# Patient Record
Sex: MALE | Race: WHITE | Employment: FULL TIME | URBAN - METROPOLITAN AREA
[De-identification: names, ages, dates, MRNs, and addresses within clinical notes are randomized per-mention and may not be internally consistent; named-entity substitution may affect disease eponyms.]

---

## 2021-05-31 ENCOUNTER — RECORDS - HEALTHEAST (OUTPATIENT)
Dept: ADMINISTRATIVE | Facility: CLINIC | Age: 61
End: 2021-05-31

## 2023-06-07 ENCOUNTER — TRANSFERRED RECORDS (OUTPATIENT)
Dept: HEALTH INFORMATION MANAGEMENT | Facility: CLINIC | Age: 63
End: 2023-06-07

## 2023-06-08 ENCOUNTER — TRANSFERRED RECORDS (OUTPATIENT)
Dept: HEALTH INFORMATION MANAGEMENT | Facility: CLINIC | Age: 63
End: 2023-06-08

## 2023-07-07 ASSESSMENT — ENCOUNTER SYMPTOMS
HEADACHES: 0
SHORTNESS OF BREATH: 0
PALPITATIONS: 0
NERVOUS/ANXIOUS: 0
DIARRHEA: 0
HEARTBURN: 0
FREQUENCY: 0
ABDOMINAL PAIN: 0
WEAKNESS: 0
MYALGIAS: 0
PARESTHESIAS: 0
COUGH: 0
NAUSEA: 0
ARTHRALGIAS: 0
SORE THROAT: 0
JOINT SWELLING: 0
DYSURIA: 0
EYE PAIN: 0
FEVER: 0
CONSTIPATION: 0
HEMATURIA: 0
CHILLS: 0
HEMATOCHEZIA: 0
DIZZINESS: 0

## 2023-07-14 ENCOUNTER — OFFICE VISIT (OUTPATIENT)
Dept: FAMILY MEDICINE | Facility: CLINIC | Age: 63
End: 2023-07-14
Payer: COMMERCIAL

## 2023-07-14 VITALS
HEART RATE: 64 BPM | HEIGHT: 69 IN | TEMPERATURE: 96.4 F | SYSTOLIC BLOOD PRESSURE: 136 MMHG | RESPIRATION RATE: 16 BRPM | WEIGHT: 199 LBS | BODY MASS INDEX: 29.47 KG/M2 | DIASTOLIC BLOOD PRESSURE: 89 MMHG | OXYGEN SATURATION: 96 %

## 2023-07-14 DIAGNOSIS — Z12.11 SCREEN FOR COLON CANCER: ICD-10-CM

## 2023-07-14 DIAGNOSIS — Z12.5 SCREENING FOR PROSTATE CANCER: ICD-10-CM

## 2023-07-14 DIAGNOSIS — Z11.59 NEED FOR HEPATITIS C SCREENING TEST: ICD-10-CM

## 2023-07-14 DIAGNOSIS — Z11.4 SCREENING FOR HIV (HUMAN IMMUNODEFICIENCY VIRUS): ICD-10-CM

## 2023-07-14 DIAGNOSIS — Z00.00 ROUTINE GENERAL MEDICAL EXAMINATION AT A HEALTH CARE FACILITY: Primary | ICD-10-CM

## 2023-07-14 DIAGNOSIS — R20.0 NUMBNESS OF RIGHT FOOT: ICD-10-CM

## 2023-07-14 DIAGNOSIS — Z13.220 LIPID SCREENING: ICD-10-CM

## 2023-07-14 LAB
ALBUMIN SERPL BCG-MCNC: 4.7 G/DL (ref 3.5–5.2)
ALP SERPL-CCNC: 59 U/L (ref 40–129)
ALT SERPL W P-5'-P-CCNC: 28 U/L (ref 0–70)
ANION GAP SERPL CALCULATED.3IONS-SCNC: 11 MMOL/L (ref 7–15)
AST SERPL W P-5'-P-CCNC: 29 U/L (ref 0–45)
BASOPHILS # BLD AUTO: 0 10E3/UL (ref 0–0.2)
BASOPHILS NFR BLD AUTO: 1 %
BILIRUB SERPL-MCNC: 0.6 MG/DL
BUN SERPL-MCNC: 15.1 MG/DL (ref 8–23)
CALCIUM SERPL-MCNC: 9.6 MG/DL (ref 8.8–10.2)
CHLORIDE SERPL-SCNC: 105 MMOL/L (ref 98–107)
CHOLEST SERPL-MCNC: 204 MG/DL
CREAT SERPL-MCNC: 1.03 MG/DL (ref 0.67–1.17)
DEPRECATED HCO3 PLAS-SCNC: 25 MMOL/L (ref 22–29)
EOSINOPHIL # BLD AUTO: 0.2 10E3/UL (ref 0–0.7)
EOSINOPHIL NFR BLD AUTO: 3 %
ERYTHROCYTE [DISTWIDTH] IN BLOOD BY AUTOMATED COUNT: 12.1 % (ref 10–15)
GFR SERPL CREATININE-BSD FRML MDRD: 82 ML/MIN/1.73M2
GLUCOSE SERPL-MCNC: 91 MG/DL (ref 70–99)
HCT VFR BLD AUTO: 46.3 % (ref 40–53)
HDLC SERPL-MCNC: 62 MG/DL
HGB BLD-MCNC: 15.9 G/DL (ref 13.3–17.7)
IMM GRANULOCYTES # BLD: 0 10E3/UL
IMM GRANULOCYTES NFR BLD: 0 %
LDLC SERPL CALC-MCNC: 120 MG/DL
LYMPHOCYTES # BLD AUTO: 1.5 10E3/UL (ref 0.8–5.3)
LYMPHOCYTES NFR BLD AUTO: 29 %
MCH RBC QN AUTO: 31.5 PG (ref 26.5–33)
MCHC RBC AUTO-ENTMCNC: 34.3 G/DL (ref 31.5–36.5)
MCV RBC AUTO: 92 FL (ref 78–100)
MONOCYTES # BLD AUTO: 0.5 10E3/UL (ref 0–1.3)
MONOCYTES NFR BLD AUTO: 10 %
NEUTROPHILS # BLD AUTO: 2.9 10E3/UL (ref 1.6–8.3)
NEUTROPHILS NFR BLD AUTO: 56 %
NONHDLC SERPL-MCNC: 142 MG/DL
PLATELET # BLD AUTO: 212 10E3/UL (ref 150–450)
POTASSIUM SERPL-SCNC: 4.3 MMOL/L (ref 3.4–5.3)
PROT SERPL-MCNC: 7.3 G/DL (ref 6.4–8.3)
PSA SERPL DL<=0.01 NG/ML-MCNC: 2.04 NG/ML (ref 0–4.5)
RBC # BLD AUTO: 5.04 10E6/UL (ref 4.4–5.9)
SODIUM SERPL-SCNC: 141 MMOL/L (ref 136–145)
TRIGL SERPL-MCNC: 110 MG/DL
WBC # BLD AUTO: 5.1 10E3/UL (ref 4–11)

## 2023-07-14 PROCEDURE — 80061 LIPID PANEL: CPT | Performed by: FAMILY MEDICINE

## 2023-07-14 PROCEDURE — 87389 HIV-1 AG W/HIV-1&-2 AB AG IA: CPT | Performed by: FAMILY MEDICINE

## 2023-07-14 PROCEDURE — 90715 TDAP VACCINE 7 YRS/> IM: CPT | Performed by: FAMILY MEDICINE

## 2023-07-14 PROCEDURE — 90471 IMMUNIZATION ADMIN: CPT | Performed by: FAMILY MEDICINE

## 2023-07-14 PROCEDURE — 86803 HEPATITIS C AB TEST: CPT | Performed by: FAMILY MEDICINE

## 2023-07-14 PROCEDURE — 36415 COLL VENOUS BLD VENIPUNCTURE: CPT | Performed by: FAMILY MEDICINE

## 2023-07-14 PROCEDURE — 80053 COMPREHEN METABOLIC PANEL: CPT | Performed by: FAMILY MEDICINE

## 2023-07-14 PROCEDURE — 99386 PREV VISIT NEW AGE 40-64: CPT | Mod: 25 | Performed by: FAMILY MEDICINE

## 2023-07-14 PROCEDURE — 85025 COMPLETE CBC W/AUTO DIFF WBC: CPT | Performed by: FAMILY MEDICINE

## 2023-07-14 PROCEDURE — G0103 PSA SCREENING: HCPCS | Performed by: FAMILY MEDICINE

## 2023-07-14 ASSESSMENT — ENCOUNTER SYMPTOMS
FEVER: 0
ARTHRALGIAS: 0
MYALGIAS: 0
HEARTBURN: 0
WEAKNESS: 0
DYSURIA: 0
HEMATURIA: 0
JOINT SWELLING: 0
HEADACHES: 0
DIZZINESS: 0
ABDOMINAL PAIN: 0
SORE THROAT: 0
COUGH: 0
PARESTHESIAS: 0
PALPITATIONS: 0
FREQUENCY: 0
EYE PAIN: 0
CONSTIPATION: 0
NAUSEA: 0
CHILLS: 0
NERVOUS/ANXIOUS: 0
SHORTNESS OF BREATH: 0
HEMATOCHEZIA: 0
DIARRHEA: 0

## 2023-07-14 NOTE — PROGRESS NOTES
ASSESSMENT/PLAN:       ICD-10-CM    1. Routine general medical examination at a health care facility  Z00.00 CBC with platelets and differential     Comprehensive metabolic panel (BMP + Alb, Alk Phos, ALT, AST, Total. Bili, TP)     CBC with platelets and differential     Comprehensive metabolic panel (BMP + Alb, Alk Phos, ALT, AST, Total. Bili, TP)      2. Screen for colon cancer  Z12.11 Colonoscopy Screening  Referral      3. Screening for HIV (human immunodeficiency virus)  Z11.4 HIV Antigen Antibody Combo     HIV Antigen Antibody Combo      4. Need for hepatitis C screening test  Z11.59 Hepatitis C Screen Reflex to HCV RNA Quant and Genotype     Hepatitis C Screen Reflex to HCV RNA Quant and Genotype      5. Screening for prostate cancer  Z12.5 PSA, screen     PSA, screen      6. Lipid screening  Z13.220 Lipid panel reflex to direct LDL Non-fasting     Lipid panel reflex to direct LDL Non-fasting      7. Numbness of right foot  R20.0         Medical decision making: New patient to clinic.  Has not seen a physician for a long time.  Feels healthy except for some noted numbness in his right lateral side of the foot which he believes can sided with a back sprain about a year ago.  However the sprain pain is on the left side.  These do not correlate.  Exam shows some numbness in the plantar side of the left foot but also some callosities.  Further work-up can be considered.  Patient to schedule an appointment if he would like further work-up for his numbness that may include additional blood testing along with possible EMG.  Screening and health maintenance labs discussed and ordered as above.    Patient had a colonoscopy last year and was noted to have polyps.  Was asked to follow-up in couple of years.  I have ordered colonoscopy as his sister  from colon cancer.  We will also obtain records and review regarding follow-up plan.    COUNSELING:   Reviewed preventive health counseling, as reflected in  "patient instructions       Regular exercise       Healthy diet/nutrition       Vision screening       Consider Hep C screening for all patients one time for ages 18-79 years       HIV screeninx in teen years, 1x in adult years, and at intervals if high risk       Colorectal cancer screening       Prostate cancer screening      BMI:   Estimated body mass index is 29.18 kg/m  as calculated from the following:    Height as of this encounter: 1.759 m (5' 9.25\").    Weight as of this encounter: 90.3 kg (199 lb).   Weight management plan: Discussed healthy diet and exercise guidelines      He reports that he has never smoked. He has never been exposed to tobacco smoke. He has never used smokeless tobacco.      SUBJECTIVE:   CC: Jeramie is an 62 year old who presents for preventative health visit.       2023     3:05 PM   Additional Questions   Roomed by Braulio HILL   Accompanied by prerna     Healthy Habits:     Getting at least 3 servings of Calcium per day:  NO    Bi-annual eye exam:  Yes    Dental care twice a year:  Yes    Sleep apnea or symptoms of sleep apnea:  None    Diet:  Regular (no restrictions)    Frequency of exercise:  6-7 days/week    Duration of exercise:  Greater than 60 minutes    Taking medications regularly:  Yes    Medication side effects:  None    Additional concerns today:  No      Today's PHQ-2 Score:       2023     2:50 PM   PHQ-2 ( 1999 [employer])   Q1: Little interest or pleasure in doing things 0   Q2: Feeling down, depressed or hopeless 0   PHQ-2 Score 0   Q1: Little interest or pleasure in doing things Not at all   Q2: Feeling down, depressed or hopeless Not at all   PHQ-2 Score 0             Have you ever done Advance Care Planning?  No, advance care planning information given to patient to review.  Advanced care planning was discussed at today's visit.    Social History     Tobacco Use     Smoking status: Never     Passive exposure: Never     Smokeless tobacco: Never   Substance " Use Topics     Alcohol use: Not on file             7/7/2023     2:24 PM   Alcohol Use   Prescreen: >3 drinks/day or >7 drinks/week? No       Last PSA: No results found for: PSA    Reviewed orders with patient. Reviewed health maintenance and updated orders accordingly - Yes  BP Readings from Last 3 Encounters:   07/14/23 136/89    Wt Readings from Last 3 Encounters:   07/14/23 90.3 kg (199 lb)                  There is no problem list on file for this patient.    History reviewed. No pertinent surgical history.    Social History     Tobacco Use     Smoking status: Never     Passive exposure: Never     Smokeless tobacco: Never   Substance Use Topics     Alcohol use: Not on file     Family History   Problem Relation Age of Onset     Hyperlipidemia Mother      Hypertension Mother      Hyperlipidemia Father      Prostate Cancer Father      Colon Cancer Sister      Prostate Cancer Brother          Current Outpatient Medications   Medication Sig Dispense Refill     Multiple Vitamins-Minerals (CENTRUM SILVER 50+MEN PO) Take by mouth daily         Reviewed and updated as needed this visit by clinical staff   Tobacco  Allergies  Meds  Problems  Med Hx  Surg Hx  Fam Hx          Reviewed and updated as needed this visit by Provider   Tobacco  Allergies  Meds  Problems  Med Hx  Surg Hx  Fam Hx         History reviewed. No pertinent past medical history.   History reviewed. No pertinent surgical history.    Review of Systems   Constitutional: Negative for chills and fever.   HENT: Negative for congestion, ear pain, hearing loss and sore throat.    Eyes: Negative for pain and visual disturbance.   Respiratory: Negative for cough and shortness of breath.    Cardiovascular: Negative for chest pain, palpitations and peripheral edema.   Gastrointestinal: Negative for abdominal pain, constipation, diarrhea, heartburn, hematochezia and nausea.   Genitourinary: Negative for dysuria, frequency, genital sores, hematuria,  "impotence, penile discharge and urgency.   Musculoskeletal: Negative for arthralgias, joint swelling and myalgias.   Skin: Negative for rash.   Neurological: Negative for dizziness, weakness, headaches and paresthesias.   Psychiatric/Behavioral: Negative for mood changes. The patient is not nervous/anxious.          OBJECTIVE:   /89 (BP Location: Left arm, Patient Position: Sitting, Cuff Size: Adult Large)   Pulse 64   Temp (!) 96.4  F (35.8  C) (Oral)   Resp 16   Ht 1.759 m (5' 9.25\")   Wt 90.3 kg (199 lb)   SpO2 96%   BMI 29.18 kg/m      Physical Exam  GENERAL: healthy, alert and no distress  EYES: Eyes grossly normal to inspection, PERRL and conjunctivae and sclerae normal  HENT: ear canals and TM's normal, nose and mouth without ulcers or lesions  NECK: no adenopathy, no asymmetry, masses, or scars and thyroid normal to palpation  RESP: lungs clear to auscultation - no rales, rhonchi or wheezes  CV: regular rate and rhythm, normal S1 S2, no S3 or S4, no murmur, click or rub, no peripheral edema and peripheral pulses strong  ABDOMEN: soft, nontender, no hepatosplenomegaly, no masses and bowel sounds normal  MS: extremities normal- no gross deformities noted.  Sensation to touch diminished/absent in the lateral plantar side of the right foot.  Present on the dorsum and rest foot exam is normal.  Noted some callus cities.        Luyc Burk MD  [facility]    Prior to immunization administration, verified patients identity using patient s name and date of birth. Please see Immunization Activity for additional information.     Screening Questionnaire for Adult Immunization    Are you sick today?   No   Do you have allergies to medications, food, a vaccine component or latex?   No   Have you ever had a serious reaction after receiving a vaccination?   No   Do you have a long-term health problem with heart, lung, kidney, or metabolic disease (e.g., diabetes), asthma, a " blood disorder, no spleen, complement component deficiency, a cochlear implant, or a spinal fluid leak?  Are you on long-term aspirin therapy?   No   Do you have cancer, leukemia, HIV/AIDS, or any other immune system problem?   No   Do you have a parent, brother, or sister with an immune system problem?   No   In the past 3 months, have you taken medications that affect  your immune system, such as prednisone, other steroids, or anticancer drugs; drugs for the treatment of rheumatoid arthritis, Crohn s disease, or psoriasis; or have you had radiation treatments?   No   Have you had a seizure, or a brain or other nervous system problem?   No   During the past year, have you received a transfusion of blood or blood    products, or been given immune (gamma) globulin or antiviral drug?   No   For women: Are you pregnant or is there a chance you could become       pregnant during the next month?   No   Have you received any vaccinations in the past 4 weeks?   No     Immunization questionnaire answers were all negative.      Patient instructed to remain in clinic for 15 minutes afterwards, and to report any adverse reactions.     Screening performed by Jeevan Payne on 7/14/2023 at 3:55 PM.

## 2023-07-15 LAB
HCV AB SERPL QL IA: NONREACTIVE
HIV 1+2 AB+HIV1 P24 AG SERPL QL IA: NONREACTIVE

## 2024-01-29 ENCOUNTER — HOSPITAL ENCOUNTER (INPATIENT)
Facility: HOSPITAL | Age: 64
LOS: 2 days | Discharge: HOME | DRG: 390 | End: 2024-01-31
Attending: EMERGENCY MEDICINE | Admitting: INTERNAL MEDICINE
Payer: COMMERCIAL

## 2024-01-29 DIAGNOSIS — K29.70 GASTRITIS WITHOUT BLEEDING, UNSPECIFIED CHRONICITY, UNSPECIFIED GASTRITIS TYPE: ICD-10-CM

## 2024-01-29 DIAGNOSIS — N28.9 RENAL LESION: Primary | ICD-10-CM

## 2024-01-29 DIAGNOSIS — K56.600 PARTIAL SMALL BOWEL OBSTRUCTION (H): ICD-10-CM

## 2024-01-29 LAB
ANION GAP SERPL CALCULATED.3IONS-SCNC: 13 MMOL/L (ref 7–15)
BASOPHILS # BLD AUTO: 0 10E3/UL (ref 0–0.2)
BASOPHILS NFR BLD AUTO: 1 %
BUN SERPL-MCNC: 10 MG/DL (ref 8–23)
CALCIUM SERPL-MCNC: 9.2 MG/DL (ref 8.8–10.2)
CHLORIDE SERPL-SCNC: 105 MMOL/L (ref 98–107)
CREAT SERPL-MCNC: 0.97 MG/DL (ref 0.67–1.17)
DEPRECATED HCO3 PLAS-SCNC: 23 MMOL/L (ref 22–29)
EGFRCR SERPLBLD CKD-EPI 2021: 88 ML/MIN/1.73M2
EOSINOPHIL # BLD AUTO: 0.1 10E3/UL (ref 0–0.7)
EOSINOPHIL NFR BLD AUTO: 1 %
ERYTHROCYTE [DISTWIDTH] IN BLOOD BY AUTOMATED COUNT: 12.4 % (ref 10–15)
GLUCOSE SERPL-MCNC: 113 MG/DL (ref 70–99)
HCT VFR BLD AUTO: 49.2 % (ref 40–53)
HGB BLD-MCNC: 17 G/DL (ref 13.3–17.7)
IMM GRANULOCYTES # BLD: 0 10E3/UL
IMM GRANULOCYTES NFR BLD: 0 %
LACTATE SERPL-SCNC: 1.2 MMOL/L (ref 0.7–2)
LYMPHOCYTES # BLD AUTO: 1.2 10E3/UL (ref 0.8–5.3)
LYMPHOCYTES NFR BLD AUTO: 16 %
MAGNESIUM SERPL-MCNC: 2.2 MG/DL (ref 1.7–2.3)
MCH RBC QN AUTO: 31.7 PG (ref 26.5–33)
MCHC RBC AUTO-ENTMCNC: 34.6 G/DL (ref 31.5–36.5)
MCV RBC AUTO: 92 FL (ref 78–100)
MONOCYTES # BLD AUTO: 0.7 10E3/UL (ref 0–1.3)
MONOCYTES NFR BLD AUTO: 9 %
NEUTROPHILS # BLD AUTO: 5.6 10E3/UL (ref 1.6–8.3)
NEUTROPHILS NFR BLD AUTO: 73 %
NRBC # BLD AUTO: 0 10E3/UL
NRBC BLD AUTO-RTO: 0 /100
PHOSPHATE SERPL-MCNC: 4.5 MG/DL (ref 2.5–4.5)
PLATELET # BLD AUTO: 230 10E3/UL (ref 150–450)
POTASSIUM SERPL-SCNC: 4.2 MMOL/L (ref 3.4–5.3)
RBC # BLD AUTO: 5.36 10E6/UL (ref 4.4–5.9)
SODIUM SERPL-SCNC: 141 MMOL/L (ref 135–145)
WBC # BLD AUTO: 7.7 10E3/UL (ref 4–11)

## 2024-01-29 PROCEDURE — 96360 HYDRATION IV INFUSION INIT: CPT

## 2024-01-29 PROCEDURE — 250N000011 HC RX IP 250 OP 636: Performed by: INTERNAL MEDICINE

## 2024-01-29 PROCEDURE — 80048 BASIC METABOLIC PNL TOTAL CA: CPT | Performed by: EMERGENCY MEDICINE

## 2024-01-29 PROCEDURE — C9113 INJ PANTOPRAZOLE SODIUM, VIA: HCPCS | Performed by: INTERNAL MEDICINE

## 2024-01-29 PROCEDURE — 250N000011 HC RX IP 250 OP 636: Performed by: EMERGENCY MEDICINE

## 2024-01-29 PROCEDURE — 99285 EMERGENCY DEPT VISIT HI MDM: CPT | Mod: 25

## 2024-01-29 PROCEDURE — 0D9670Z DRAINAGE OF STOMACH WITH DRAINAGE DEVICE, VIA NATURAL OR ARTIFICIAL OPENING: ICD-10-PCS | Performed by: EMERGENCY MEDICINE

## 2024-01-29 PROCEDURE — 84100 ASSAY OF PHOSPHORUS: CPT | Performed by: INTERNAL MEDICINE

## 2024-01-29 PROCEDURE — 120N000001 HC R&B MED SURG/OB

## 2024-01-29 PROCEDURE — 96361 HYDRATE IV INFUSION ADD-ON: CPT

## 2024-01-29 PROCEDURE — 258N000001 HC RX 258: Performed by: INTERNAL MEDICINE

## 2024-01-29 PROCEDURE — 85025 COMPLETE CBC W/AUTO DIFF WBC: CPT | Performed by: EMERGENCY MEDICINE

## 2024-01-29 PROCEDURE — 258N000003 HC RX IP 258 OP 636: Performed by: INTERNAL MEDICINE

## 2024-01-29 PROCEDURE — 258N000003 HC RX IP 258 OP 636: Performed by: EMERGENCY MEDICINE

## 2024-01-29 PROCEDURE — 99222 1ST HOSP IP/OBS MODERATE 55: CPT | Performed by: INTERNAL MEDICINE

## 2024-01-29 PROCEDURE — 36415 COLL VENOUS BLD VENIPUNCTURE: CPT | Performed by: EMERGENCY MEDICINE

## 2024-01-29 PROCEDURE — 83735 ASSAY OF MAGNESIUM: CPT | Performed by: INTERNAL MEDICINE

## 2024-01-29 PROCEDURE — 83605 ASSAY OF LACTIC ACID: CPT | Performed by: EMERGENCY MEDICINE

## 2024-01-29 RX ORDER — CALCIUM CARBONATE 500 MG/1
1000 TABLET, CHEWABLE ORAL 4 TIMES DAILY PRN
Status: DISCONTINUED | OUTPATIENT
Start: 2024-01-29 | End: 2024-01-31 | Stop reason: HOSPADM

## 2024-01-29 RX ORDER — AMOXICILLIN 250 MG
1 CAPSULE ORAL 2 TIMES DAILY PRN
Status: DISCONTINUED | OUTPATIENT
Start: 2024-01-29 | End: 2024-01-31 | Stop reason: HOSPADM

## 2024-01-29 RX ORDER — ACETAMINOPHEN 650 MG/1
650 SUPPOSITORY RECTAL EVERY 4 HOURS PRN
Status: DISCONTINUED | OUTPATIENT
Start: 2024-01-29 | End: 2024-01-31 | Stop reason: HOSPADM

## 2024-01-29 RX ORDER — ONDANSETRON 4 MG/1
4 TABLET, ORALLY DISINTEGRATING ORAL EVERY 6 HOURS PRN
Status: DISCONTINUED | OUTPATIENT
Start: 2024-01-29 | End: 2024-01-31 | Stop reason: HOSPADM

## 2024-01-29 RX ORDER — ONDANSETRON 2 MG/ML
4 INJECTION INTRAMUSCULAR; INTRAVENOUS EVERY 6 HOURS PRN
Status: DISCONTINUED | OUTPATIENT
Start: 2024-01-29 | End: 2024-01-31 | Stop reason: HOSPADM

## 2024-01-29 RX ORDER — ONDANSETRON 2 MG/ML
4 INJECTION INTRAMUSCULAR; INTRAVENOUS ONCE
Status: COMPLETED | OUTPATIENT
Start: 2024-01-29 | End: 2024-01-29

## 2024-01-29 RX ORDER — NALOXONE HYDROCHLORIDE 0.4 MG/ML
0.4 INJECTION, SOLUTION INTRAMUSCULAR; INTRAVENOUS; SUBCUTANEOUS
Status: DISCONTINUED | OUTPATIENT
Start: 2024-01-29 | End: 2024-01-31 | Stop reason: HOSPADM

## 2024-01-29 RX ORDER — DEXTROSE MONOHYDRATE, SODIUM CHLORIDE, AND POTASSIUM CHLORIDE 50; 1.49; 9 G/1000ML; G/1000ML; G/1000ML
INJECTION, SOLUTION INTRAVENOUS CONTINUOUS
Status: DISCONTINUED | OUTPATIENT
Start: 2024-01-29 | End: 2024-01-31 | Stop reason: HOSPADM

## 2024-01-29 RX ORDER — HYDRALAZINE HYDROCHLORIDE 10 MG/1
10 TABLET, FILM COATED ORAL EVERY 4 HOURS PRN
Status: DISCONTINUED | OUTPATIENT
Start: 2024-01-29 | End: 2024-01-31 | Stop reason: HOSPADM

## 2024-01-29 RX ORDER — NALOXONE HYDROCHLORIDE 0.4 MG/ML
0.2 INJECTION, SOLUTION INTRAMUSCULAR; INTRAVENOUS; SUBCUTANEOUS
Status: DISCONTINUED | OUTPATIENT
Start: 2024-01-29 | End: 2024-01-31 | Stop reason: HOSPADM

## 2024-01-29 RX ORDER — AMOXICILLIN 250 MG
2 CAPSULE ORAL 2 TIMES DAILY PRN
Status: DISCONTINUED | OUTPATIENT
Start: 2024-01-29 | End: 2024-01-31 | Stop reason: HOSPADM

## 2024-01-29 RX ORDER — HYDRALAZINE HYDROCHLORIDE 20 MG/ML
10 INJECTION INTRAMUSCULAR; INTRAVENOUS EVERY 4 HOURS PRN
Status: DISCONTINUED | OUTPATIENT
Start: 2024-01-29 | End: 2024-01-31 | Stop reason: HOSPADM

## 2024-01-29 RX ORDER — ACETAMINOPHEN 325 MG/1
650 TABLET ORAL EVERY 4 HOURS PRN
Status: DISCONTINUED | OUTPATIENT
Start: 2024-01-29 | End: 2024-01-31 | Stop reason: HOSPADM

## 2024-01-29 RX ORDER — LIDOCAINE 40 MG/G
CREAM TOPICAL
Status: DISCONTINUED | OUTPATIENT
Start: 2024-01-29 | End: 2024-01-31 | Stop reason: HOSPADM

## 2024-01-29 RX ADMIN — SODIUM CHLORIDE 500 ML: 9 INJECTION, SOLUTION INTRAVENOUS at 13:00

## 2024-01-29 RX ADMIN — POTASSIUM CHLORIDE, DEXTROSE MONOHYDRATE AND SODIUM CHLORIDE: 150; 5; 900 INJECTION, SOLUTION INTRAVENOUS at 18:35

## 2024-01-29 RX ADMIN — PANTOPRAZOLE SODIUM 40 MG: 40 INJECTION, POWDER, FOR SOLUTION INTRAVENOUS at 17:13

## 2024-01-29 RX ADMIN — SODIUM CHLORIDE 500 ML: 9 INJECTION, SOLUTION INTRAVENOUS at 17:13

## 2024-01-29 RX ADMIN — ONDANSETRON 4 MG: 2 INJECTION INTRAMUSCULAR; INTRAVENOUS at 15:03

## 2024-01-29 ASSESSMENT — ACTIVITIES OF DAILY LIVING (ADL)
ADLS_ACUITY_SCORE: 35
ADLS_ACUITY_SCORE: 20
ADLS_ACUITY_SCORE: 20
ADLS_ACUITY_SCORE: 35
ADLS_ACUITY_SCORE: 20

## 2024-01-29 NOTE — ED NOTES
NG tube placed and secured at 65 cm at the nare. Pt with 800 ml output immediately after placement. Pt reports some relief. NG set to low intermittent suction.

## 2024-01-29 NOTE — H&P
[facility]    History and Physical - Hospitalist Service       Date of Admission:  1/29/2024    Assessment & Plan      Jeramie Licea is a 63 year old male with no significant past medical history, abdominal surgery who initially went to urgent care for evaluation of abdominal pain and discomfort, CT imaging concerning for small bowel obstruction and sent to [facility] ED for further management.     Acute abdominal discomfort and distention;  CT imaging reported partial SBO;  -- Patient started having abdominal distention and discomfort since 1/28, not passing gas and feeling nauseated.  No reported febrile illness.  -- Outside hospital CT at urgent care reported short segment area of wall thickening within mid duodenum with upstream fecalization and dilated loops of proximal small bowel, compatible with at least partial obstruction  -- In ED, normal WBC count, normal lactic acid, normal lipase  -- NG tube placed, put out almost 1.5 L and patient reported feeling much better  -- Continue n.p.o., NG tube.  Surgery consulted  --Received 1 L IV fluid bolus.  Continue IV fluid.      Mild gastric antral wall thickening, likely gastritis; likely due to above  -- Patient does not look typical gastritis/GERD symptoms.  --Ordered PPI.  Recommend outpatient GI referral for endoscopy, defer to PCP    CT imaging reported multiple indeterminant renal lesions, recommended nonemergent/outpatient contrast-enhanced renal MRI for further evaluation.    --Defer to PCP for outpatient imaging  -- Request rounding hospitalist to discussed CT finding with patient            Diet: NPO for Medical/Clinical Reasons Except for: Meds    DVT Prophylaxis: Pneumatic Compression Devices and Ambulate every shift  Tyler Catheter: Not present  Lines: None     Cardiac Monitoring: None  Code Status: Full Code      Clinically Significant Risk Factors Present on Admission                       # Overweight: Estimated body  "mass index is 28.17 kg/m  as calculated from the following:    Height as of this encounter: 1.803 m (5' 11\").    Weight as of this encounter: 91.6 kg (201 lb 15.1 oz).              Disposition Plan      Expected Discharge Date: 01/31/2024                  Alessandro Layne MD  Hospitalist Service  [facility]  Securely message with [employer] (more info)  Text page via [employer] Paging/Directory     ______________________________________________________________________    Chief Complaint   Abdominal discomfort and distention    History is obtained from the patient    History of Present Illness   Jeramie Licea is a 63 year old male with no significant past medical history, abdominal surgery who initially went to urgent care for evaluation of abdominal pain and discomfort, CT imaging concerning for small bowel obstruction and sent to [facility] ED for further management.     Patient reported since yesterday 6 PM started having abdominal distention and discomfort.  Patient reports at night and throughout the night he was having abdominal discomfort and nausea which is progressively getting worse.  Patient denied vomiting.  Denied fever or chills.  Reported last bowel movement was on morning of 1/28.  Patient denied history of abdominal surgery.  Patient denied short of breath, chest pain, dizziness.  Denied urinary symptoms.  Denied febrile illness.  Patient denied smoking or drinking.  Patient reported having colonoscopy about a year ago and polyps were removed.        Past Medical History    No past medical history on file.    Past Surgical History   No past surgical history on file.    Prior to Admission Medications   None        Review of Systems    The 10 point Review of Systems is negative other than noted in the HPI or here.     Social History   I have reviewed this patient's social history and updated it with pertinent information if needed.  Social History     Tobacco Use    Smoking status: " Never     Passive exposure: Never    Smokeless tobacco: Never         Family History   I have reviewed this patient's family history and updated it with pertinent information if needed.  Family History   Problem Relation Age of Onset    Hyperlipidemia Mother     Hypertension Mother     Hyperlipidemia Father     Prostate Cancer Father     Colon Cancer Sister     Prostate Cancer Brother         Physical Exam   Vital Signs: Temp: 97.7  F (36.5  C) Temp src: Temporal BP: 139/85 Pulse: 61   Resp: 18 SpO2: 94 %      Weight: 201 lbs 15.06 oz      General: Not in obvious distress.  HEENT: Normocephalic, supple neck  Chest: Clear to auscultation bilateral anteriorly, no wheezing  Heart: S1S2 normal, regular  Abdomen: Soft.  Mild distention, no tenderness appreciated  Extremities: No legs swelling  Neuro: alert and awake, grossly non-focal        Medical Decision Making             Data     I have personally reviewed the following data over the past 24 hrs:    7.7  \   17.0   / 230     141 105 10.0 /  113 (H)   4.2 23 0.97 \     Procal: N/A CRP: N/A Lactic Acid: 1.2         Imaging results reviewed over the past 24 hrs:   Recent Results (from the past 24 hour(s))   CT Abdomen Pelvis w Contrast    Narrative    For Patients: As a result of the 21st Century Cures Act, medical imaging exams and procedure reports are released immediately into your electronic medical record. You may view this report before your referring provider. If you have questions, please contact your health care provider.    EXAM: CT ABDOMEN PELVIS W  LOCATION: [facility] [place]  DATE: 1/29/2024    INDICATION: Bowel obstruction suspected  not passing gas, abdominal distention and pain, no hx prior abdominal surgies, possible popcorn bezoar?  COMPARISON: None.  TECHNIQUE: CT scan of the abdomen and pelvis was performed following injection of IV contrast. Multiplanar reformats were obtained. Dose reduction techniques were used.  CONTRAST:  IOPAMIDOL 300 MG/ML  ML BOTTLE: 100mL    FINDINGS:     LOWER CHEST: Coronary calcifications.    HEPATOBILIARY: Normal liver parenchyma. No biliary dilation. Normal gallbladder.    PANCREAS: Normal.    SPLEEN: Normal.    ADRENAL GLANDS: Normal.    KIDNEYS/BLADDER: Multiple indeterminate renal lesions. For example:  - 0.9 cm high attenuation exophytic lesion upper pole left kidney (series 2 image 74).  - 1.3 cm left lower pole renal lesion (series 2 image 111).    Benign right upper pole renal cyst for which no further follow-up imaging is recommended. Additional subcentimeter hypoattenuating lesions are too small to characterize. No hydronephrosis. Normal urinary bladder.     BOWEL: Mild gastric antral wall thickening. Multiple dilated loops of proximal small bowel measuring up to 3.1 cm consistent with at least partial obstruction. There is a smooth caliber change within the region of jejunal wall thickening in the left hemiabdomen (series 2 image 89). Mild fecalization of small bowel is seen immediately upstream from this region (series 2 image 88). Normal appendix. Colonic diverticulosis. Trace free fluid is seen surrounding the dilated loops of small bowel. No pneumoperitoneum or pneumatosis.     LYMPH NODES: No pathologically enlarged lymph nodes.    VASCULATURE: Nonaneurysmal aorta.     PELVIC ORGANS: No pelvic masses.    MUSCULOSKELETAL: No acute osseous abnormalities.    Impression    1.  Short segment area of wall thickening within the mid jejunum, with upstream fecalization and dilated loops of proximal small bowel, compatible with at least partial obstruction. Etiology of this wall thickening is indeterminate, possibly infectious or inflammatory. No discrete mechanical transition point. Trace free fluid is seen surrounding the dilated loops of small bowel. No bowel hypoenhancement or pneumatosis.  2.  Mild gastric antrum wall thickening, which may be seen with gastritis.  3.  Multiple indeterminate  renal lesions. Recommend nonemergent/outpatient contrast enhanced renal MRI for further evaluation.

## 2024-01-29 NOTE — ED PROVIDER NOTES
EMERGENCY DEPARTMENT ENCOUNTER      NAME: Jeramie Licea  AGE: 63 year old male  YOB: 1960  MRN: [id number]  EVALUATION DATE & TIME: No admission date for patient encounter.    PCP: Lucy Burk    ED PROVIDER: Willy Pedro D.O.      Chief Complaint   Patient presents with    Abdominal Pain    SBO       FINAL IMPRESSION:  1. Partial small bowel obstruction (H)        ED COURSE & MEDICAL DECISION MAKIN:25 PM I met with the patient to gather history and to perform my initial exam. I discussed the plan for care while in the Emergency Department.  2:31 PM I spoke with Alpesh Pagan DO, general surgery, who states they will see the patient on the floor.   2:45 PM I spoke with Dr. RAMIREZ, hospitalist, who accepts patient for admission.   2:47 PM I spoke with surgery about patient plan of care.          Pertinent Labs & Imaging studies reviewed. (See chart for details)  63 year old male presents to the Emergency Department for evaluation of abdominal pain.  No previous history of abdominal surgery.  Seen at the emergency room prior to coming to the emergency department, discovered to have a likely partial small bowel obstruction, based on clinical presentation I am concerned that there could be actually a true full bowel obstruction, as the patient has not had any bowel movements or is passing gas since yesterday.  Does have significant distention of the abdomen, and mostly upper abdominal tenderness though he was somewhat diffuse in nature.  I discussed the patient with general surgery, and patient will have an NG tube placed and will be seen as inpatient.  Discussed with hospitalist who agreed to the admission.    Medical Decision Making  Obtained supplemental history:Supplemental history obtained?: No  Reviewed external records: External records reviewed?: Documented in chart and Outpatient Record: 24 at [facility] for small bowel obstruction  Care impacted by chronic  illness:N/A  Care significantly affected by social determinants of health:N/A  Did you consider but not order tests?: Work up considered but not performed and documented in chart, if applicable  Did you interpret images independently?: Independent interpretation of ECG and images noted in documentation, when applicable.  Consultation discussion with other provider:Did you involve another provider (consultant, , pharmacy, etc.)?: I discussed the care with another health care provider, see documentation for details.  Admit.    At the conclusion of the encounter I discussed the results of all of the tests and the disposition. The questions were answered. The patient or family acknowledged understanding and was agreeable with the care plan.        HPI    Patient information was obtained from: patient     Use of : N/A        Jeramie Licea is a 63 year old male who presents for abdominal pain, SBO. Patient began experiencing abdominal cramping at 6:00 PM last night. He has since had upper abdominal pain, nausea, and is not passing gas. Patient states it is uncomfortable to lay down. He has not had anything to eat or drink since 6:00 PM last night.     Patient denies vomiting, fever, diarrhea. Denies history of abdominal surgery, smoke, alcohol use.     Per Chart Review, patient was seen on 1/29/24 at [facility] for small bowel obstruction. CT Abdomen Pelvis revealed short segment area of wall thickening within the mid jejunum, with upstream fecalization and dilated loops of proximal small bowel, compatible with at least partial obstruction. Etiology of this wall thickening is indeterminate, possibly infectious or inflammatory. No discrete mechanical transition point. Trace free fluid is seen surrounding the dilated loops of small bowel. No bowel hypoenhancement or pneumatosis. Mild gastric antrum wall thickening, which may be seen with gastritis. Multiple indeterminate renal lesions. Recommend  "nonemergent/outpatient contrast enhanced renal MRI for further evaluation.       REVIEW OF SYSTEMS  Constitutional:  Denies fever, chills, weight loss or weakness  Eyes:  No pain, discharge, redness  HENT:  Denies sore throat, ear pain, congestion  Respiratory: No SOB, wheeze or cough  Cardiovascular:  No CP, palpitations  GI:  Abdominal cramping/pain/distension, not passing gas, nausea. Denies vomiting, diarrhea  : Denies dysuria, hematuria  Musculoskeletal:  Denies any new muscle/joint pain, swelling or loss of function.  Skin:  Denies rash, pallor  Neurologic:  Denies headache, focal weakness or sensory changes  Lymph: Denies swollen nodes    All other systems negative unless noted in HPI.    PAST MEDICAL HISTORY:  No past medical history on file.    PAST SURGICAL HISTORY:  No past surgical history on file.      CURRENT MEDICATIONS:    Current Facility-Administered Medications   Medication    ondansetron (ZOFRAN) injection 4 mg     Current Outpatient Medications   Medication    Multiple Vitamins-Minerals (CENTRUM SILVER 50+MEN PO)         ALLERGIES:  No Known Allergies    FAMILY HISTORY:  Family History   Problem Relation Age of Onset    Hyperlipidemia Mother     Hypertension Mother     Hyperlipidemia Father     Prostate Cancer Father     Colon Cancer Sister     Prostate Cancer Brother        SOCIAL HISTORY:  Social History     Socioeconomic History    Marital status: Single   Tobacco Use    Smoking status: Never     Passive exposure: Never    Smokeless tobacco: Never   Social History Narrative    Works at [employer]. Lives with Partner Remington. No children. [employer].    Lucy Burk MD       VITALS:  Patient Vitals for the past 24 hrs:   BP Temp Temp src Pulse Resp SpO2 Height Weight   01/29/24 1212 (!) 154/88 -- -- -- -- -- -- --   01/29/24 1211 -- 97.7  F (36.5  C) Temporal -- -- -- -- --   01/29/24 1210 -- -- -- 55 18 96 % 1.803 m (5' 11\") 91.6 kg (201 lb 15.1 oz)       PHYSICAL EXAM    VITAL SIGNS: BP (!) " "154/88   Pulse 55   Temp 97.7  F (36.5  C) (Temporal)   Resp 18   Ht 1.803 m (5' 11\")   Wt 91.6 kg (201 lb 15.1 oz)   SpO2 96%   BMI 28.17 kg/m      General Appearance: Well-appearing, well-nourished, no acute distress   Head:  Normocephalic, without obvious abnormality, atraumatic  Eyes:  PERRL, conjunctiva/corneas clear, EOM's intact,  ENT:  Lips, mucosa, and tongue normal, membranes are moist without pallor  Neck:  Normal ROM, symmetrical, trachea midline    Chest:  No tenderness or deformity, no crepitus  Cardio:  Regular rate and rhythm, no murmur, rub or gallop, 2+ pulses symmetric in all extremities  Pulm:  Clear to auscultation bilaterally, respirations unlabored,  Back:  ROM normal, no CVA tenderness, no spinal tenderness, no paraspinal tenderness  Abdomen:  Abdominal distension, upper abdominal tenderness. No rebound or guarding.  Musculoskeletal: Full ROM, no edema, no cyanosis, good ROM of major joints  Integument:  Warm, Dry, No erythema, No rash.    Neurologic:  Alert & oriented.  No focal deficits appreciated.  Ambulatory.  Psychiatric:  Affect normal, Judgment normal, Mood normal.      LABS  Results for orders placed or performed during the hospital encounter of 01/29/24 (from the past 24 hour(s))   CBC with platelets + differential    Narrative    The following orders were created for panel order CBC with platelets + differential.  Procedure                               Abnormality         Status                     ---------                               -----------         ------                     CBC with platelets and d...[[id number]]                      Final result                 Please view results for these tests on the individual orders.   Basic metabolic panel   Result Value Ref Range    Sodium 141 135 - 145 mmol/L    Potassium 4.2 3.4 - 5.3 mmol/L    Chloride 105 98 - 107 mmol/L    Carbon Dioxide (CO2) 23 22 - 29 mmol/L    Anion Gap 13 7 - 15 mmol/L    Urea Nitrogen 10.0 8.0 - " 23.0 mg/dL    Creatinine 0.97 0.67 - 1.17 mg/dL    GFR Estimate 88 >60 mL/min/1.73m2    Calcium 9.2 8.8 - 10.2 mg/dL    Glucose 113 (H) 70 - 99 mg/dL   Lactic acid whole blood   Result Value Ref Range    Lactic Acid 1.2 0.7 - 2.0 mmol/L   CBC with platelets and differential   Result Value Ref Range    WBC Count 7.7 4.0 - 11.0 10e3/uL    RBC Count 5.36 4.40 - 5.90 10e6/uL    Hemoglobin 17.0 13.3 - 17.7 g/dL    Hematocrit 49.2 40.0 - 53.0 %    MCV 92 78 - 100 fL    MCH 31.7 26.5 - 33.0 pg    MCHC 34.6 31.5 - 36.5 g/dL    RDW 12.4 10.0 - 15.0 %    Platelet Count 230 150 - 450 10e3/uL    % Neutrophils 73 %    % Lymphocytes 16 %    % Monocytes 9 %    % Eosinophils 1 %    % Basophils 1 %    % Immature Granulocytes 0 %    NRBCs per 100 WBC 0 <1 /100    Absolute Neutrophils 5.6 1.6 - 8.3 10e3/uL    Absolute Lymphocytes 1.2 0.8 - 5.3 10e3/uL    Absolute Monocytes 0.7 0.0 - 1.3 10e3/uL    Absolute Eosinophils 0.1 0.0 - 0.7 10e3/uL    Absolute Basophils 0.0 0.0 - 0.2 10e3/uL    Absolute Immature Granulocytes 0.0 <=0.4 10e3/uL    Absolute NRBCs 0.0 10e3/uL       MEDICATIONS GIVEN IN THE EMERGENCY:  Medications   ondansetron (ZOFRAN) injection 4 mg (has no administration in time range)   sodium chloride 0.9% BOLUS 500 mL (500 mLs Intravenous $New Bag 1/29/24 1300)       NEW PRESCRIPTIONS STARTED AT TODAY'S ER VISIT  New Prescriptions    No medications on file        I, Ryan Woodall , am serving as a scribe to document services personally performed by Willy Pedro D.O., based on my observations and the provider's statements to me.  I, Willy Pedro D.O., attest that Ryan Woodall  is acting in a scribe capacity, has observed my performance of the services and has documented them in accordance with my direction.     Willy Pedro D.O.  Emergency Medicine  [facility] EMERGENCY DEPARTMENT  [address] [place] [id number]  [phone]  Dept: [phone]       Willy Pedro DO  01/29/24 1528

## 2024-01-29 NOTE — ED NOTES
Expected Patient Referral to ED  11:05 AM    Referring Clinic/Provider:  [facility]    Reason for referral/Clinical facts:  64y/o male with partial SBO for 24 hours.  Mid abdomen transition point.    Recommendations provided:  Send to ED for further evaluation    Caller was informed that this institution does possess the capabilities and/or resources to provide for patient and should be transferred to our facility.    Discussed that if direct admit is sought and any hurdles are encountered, this ED would be happy to see the patient and evaluate.    Informed caller that recommendations provided are recommendations based only on the facts provided and that they responsible to accept or reject the advice, or to seek a formal in person consultation as needed and that this ED will see/treat patient should they arrive.      LINH PEDRO DO  [facility] EMERGENCY DEPARTMENT  [facility] [facility] [address] [phone]       Linh Pedro DO  01/29/24 3865

## 2024-01-29 NOTE — MEDICATION SCRIBE - ADMISSION MEDICATION HISTORY
Medication Scribe Admission Medication History    Admission medication history is complete. The information provided in this note is only as accurate as the sources available at the time of the update.    Information Source(s): Patient via in-person    Pertinent Information: Patient reports taking no medications.    Changes made to PTA medication list:  Added: None  Deleted: Multivitamin  Changed: None    Medication Affordability:  Not including over the counter (OTC) medications, was there a time in the past 3 months when you did not take your medications as prescribed because of cost?: No    Allergies reviewed with patient and updates made in EHR: yes    Medication History Completed By: Christiano Villeda 1/29/2024 2:54 PM    No outpatient medications have been marked as taking for the 1/29/24 encounter (Hospital Encounter).

## 2024-01-29 NOTE — ED TRIAGE NOTES
Pt coming from  in [facility]. Diagnosed with SBO. Denies hx of this.       Given zofran and pain med at . CT was completed there. 18g RAC.     Pt has not been vomiting.

## 2024-01-29 NOTE — ED NOTES
"[facility] ED Handoff Report    ED Chief Complaint: Abdominal pain    ED Diagnosis:  (K56.600) Partial small bowel obstruction (H)  Comment: NG tube in place on intermittent low suction  Plan: Surgery consult       PMH:  No past medical history on file.     Code Status:  No Order     Falls Risk: No Band: Applied    Current Living Situation/Residence: lives in a house     Elimination Status: Continent: Yes     Activity Level: Independent    Patients Preferred Language:  English     Needed: No    Vital Signs:  BP (!) 154/88   Pulse 55   Temp 97.7  F (36.5  C) (Temporal)   Resp 18   Ht 1.803 m (5' 11\")   Wt 91.6 kg (201 lb 15.1 oz)   SpO2 96%   BMI 28.17 kg/m       Cardiac Rhythm: NA    Pain Score: 0/10    Is the Patient Confused:  No    Last Food or Drink: 01/29/24 at 1510, water with NG tube placement    Focused Assessment:  Pt is alert and oriented. NG tube in place and secured at 65cm. Continues with bilious output, has slowed since initial insertion.    Tests Performed: Done: Labs and Imaging, imaging completed at [facility] room    Treatments Provided:  NG tube, IV zofran and fluids    Family Dynamics/Concerns: No    Family Updated On Visitor Policy: Yes    Plan of Care Communicated to Family: Yes    Who Was Updated about Plan of Care: family at bedside    Belongings Checklist Done and Signed by Patient: Yes    Medications sent with patient: NA    Covid: asymptomatic , not tested    Additional Information: none at this time.    RN: Lucita Gomez RN 1/29/2024 4:34 PM      "

## 2024-01-30 LAB
ANION GAP SERPL CALCULATED.3IONS-SCNC: 6 MMOL/L (ref 7–15)
BUN SERPL-MCNC: 11.2 MG/DL (ref 8–23)
CALCIUM SERPL-MCNC: 8.1 MG/DL (ref 8.8–10.2)
CHLORIDE SERPL-SCNC: 110 MMOL/L (ref 98–107)
CREAT SERPL-MCNC: 0.96 MG/DL (ref 0.67–1.17)
DEPRECATED HCO3 PLAS-SCNC: 26 MMOL/L (ref 22–29)
EGFRCR SERPLBLD CKD-EPI 2021: 89 ML/MIN/1.73M2
GLUCOSE SERPL-MCNC: 110 MG/DL (ref 70–99)
HGB BLD-MCNC: 14.5 G/DL (ref 13.3–17.7)
POTASSIUM SERPL-SCNC: 4.2 MMOL/L (ref 3.4–5.3)
SODIUM SERPL-SCNC: 142 MMOL/L (ref 135–145)

## 2024-01-30 PROCEDURE — 36415 COLL VENOUS BLD VENIPUNCTURE: CPT | Performed by: INTERNAL MEDICINE

## 2024-01-30 PROCEDURE — C9113 INJ PANTOPRAZOLE SODIUM, VIA: HCPCS | Performed by: INTERNAL MEDICINE

## 2024-01-30 PROCEDURE — 99222 1ST HOSP IP/OBS MODERATE 55: CPT | Mod: FS

## 2024-01-30 PROCEDURE — 258N000001 HC RX 258: Performed by: INTERNAL MEDICINE

## 2024-01-30 PROCEDURE — 80048 BASIC METABOLIC PNL TOTAL CA: CPT | Performed by: INTERNAL MEDICINE

## 2024-01-30 PROCEDURE — 85018 HEMOGLOBIN: CPT | Performed by: INTERNAL MEDICINE

## 2024-01-30 PROCEDURE — 120N000001 HC R&B MED SURG/OB

## 2024-01-30 PROCEDURE — 99222 1ST HOSP IP/OBS MODERATE 55: CPT | Mod: FS | Performed by: SURGERY

## 2024-01-30 PROCEDURE — 250N000011 HC RX IP 250 OP 636: Performed by: INTERNAL MEDICINE

## 2024-01-30 PROCEDURE — 99232 SBSQ HOSP IP/OBS MODERATE 35: CPT | Performed by: INTERNAL MEDICINE

## 2024-01-30 RX ADMIN — POTASSIUM CHLORIDE, DEXTROSE MONOHYDRATE AND SODIUM CHLORIDE: 150; 5; 900 INJECTION, SOLUTION INTRAVENOUS at 17:40

## 2024-01-30 RX ADMIN — PANTOPRAZOLE SODIUM 40 MG: 40 INJECTION, POWDER, FOR SOLUTION INTRAVENOUS at 17:37

## 2024-01-30 RX ADMIN — POTASSIUM CHLORIDE, DEXTROSE MONOHYDRATE AND SODIUM CHLORIDE: 150; 5; 900 INJECTION, SOLUTION INTRAVENOUS at 05:44

## 2024-01-30 ASSESSMENT — ACTIVITIES OF DAILY LIVING (ADL)
ADLS_ACUITY_SCORE: 20

## 2024-01-30 NOTE — PROGRESS NOTES
Progress Note        [facility]     History and Physical - Hospitalist Service       Date of Admission:  1/29/2024        Assessment & Plan  Jeramie Licea is a 63 year old male with no significant past medical history, abdominal surgery who initially went to urgent care for evaluation of abdominal pain and discomfort, CT imaging concerning for small bowel obstruction and sent to [facility] ED for further management.      Acute abdominal discomfort and distention;  CT imaging reported partial SBO;  -- Patient started having abdominal distention and discomfort since 1/28, not passing gas and feeling nauseated.  No reported febrile illness.  -- Outside hospital CT at urgent care reported short segment area of wall thickening within mid duodenum with upstream fecalization and dilated loops of proximal small bowel, compatible with at least partial obstruction  -- In ED, normal WBC count, normal lactic acid, normal lipase  -- NG tube placed, put out almost 1.5 L and patient reported feeling much better  -- Continue n.p.o., NG tube.  Surgery consulted and following  --Received 1 L IV fluid bolus.  Continue IV fluid.       Mild gastric antral wall thickening, likely gastritis; likely due to above  -- Patient does not look typical gastritis/GERD symptoms.  --Ordered PPI.  Recommend outpatient GI referral for endoscopy, defer to PCP     CT imaging reported multiple indeterminant renal lesions, recommended nonemergent/outpatient contrast-enhanced renal MRI for further evaluation.    --Defer to PCP for outpatient imaging  -- Will discuss CT finding with patient              Diet: NPO for Medical/Clinical Reasons Except for: Meds    DVT Prophylaxis: Pneumatic Compression Devices and Ambulate every shift  Tyler Catheter: Not present  Lines: None     Cardiac Monitoring: None  Code Status: Full Code          Clinically Significant Risk Factors Present on Admission                        #  "Overweight: Estimated body mass index is 28.17 kg/m  as calculated from the following:    Height as of this encounter: 1.803 m (5' 11\").    Weight as of this encounter: 91.6 kg (201 lb 15.1 oz).                     Disposition Plan     Expected Discharge Date: 01/31/2024                     Subjective  Patient feels comfortable with no pain, feels he is passing some gas no nausea or vomiting NG tube in place    Objective  Vital signs in last 24 hours  Temp:  [97.5  F (36.4  C)-98.3  F (36.8  C)] 98.3  F (36.8  C)  Pulse:  [58-63] 58  Resp:  [18-20] 18  BP: (125-146)/(75-87) 126/79  SpO2:  [94 %-97 %] 94 %    Input and Output in 24 hrs     Intake/Output Summary (Last 24 hours) at 1/30/2024 1259  Last data filed at 1/29/2024 2000  Gross per 24 hour   Intake 500 ml   Output 1500 ml   Net -1000 ml       GEN: Alert and oriented. Not in acute distress  HEENT: Atraumatic    Pupils- round and reactive to light bilaterally   Neck- supple, no JVP elevation, no lymphadenopathy or thyromegaly   Sclera- anicteric   Mucous membrane- moist and pink  CHEST: Clear to auscultation bilaterally  HEART: S1S2 regular. No murmurs, rubs or gallops  ABDOMEN: Soft. Non-tender, mildly distended no organomegaly. No guarding or rigidity. Bowel sounds-mildly active  Extremities: No pedal oedema  CNS: No focal neurological deficit. No involuntary movements  SKIN: No skin rash, no cyanosis or clubbing      Pertinent Labs       Recent Results (from the past 24 hour(s))   Hemoglobin    Collection Time: 01/30/24  6:54 AM   Result Value Ref Range    Hemoglobin 14.5 13.3 - 17.7 g/dL   Basic metabolic panel    Collection Time: 01/30/24  6:54 AM   Result Value Ref Range    Sodium 142 135 - 145 mmol/L    Potassium 4.2 3.4 - 5.3 mmol/L    Chloride 110 (H) 98 - 107 mmol/L    Carbon Dioxide (CO2) 26 22 - 29 mmol/L    Anion Gap 6 (L) 7 - 15 mmol/L    Urea Nitrogen 11.2 8.0 - 23.0 mg/dL    Creatinine 0.96 0.67 - 1.17 mg/dL    GFR Estimate 89 >60 mL/min/1.73m2 "    Calcium 8.1 (L) 8.8 - 10.2 mg/dL    Glucose 110 (H) 70 - 99 mg/dL       EKG Results reviewed       Advanced Care Planning      MD SHANNON OlivarezD

## 2024-01-30 NOTE — PLAN OF CARE
Problem: Adult Inpatient Plan of Care  Goal: Absence of Hospital-Acquired Illness or Injury  Intervention: Identify and Manage Fall Risk  Recent Flowsheet Documentation  Taken 1/29/2024 2000 by Luis Ramos RN  Safety Promotion/Fall Prevention: activity supervised   Goal Outcome Evaluation:       Patient alert and oriented x4, able to interact with staff and communicate his needs. Denied pain or discomfort in this shift. NG tube remained at suction-low intermittent. Emptied green 300 ml fluid. Patient denied N/V. No gas or stool in this shift. Patient complain nose discomfort and repositioned the NG tube.

## 2024-01-30 NOTE — CONSULTS
General Surgery Consultation  Jeramie Licea MRN# [id number]   Age/Sex: 63 year old male YOB: 1960     Reason for consult: 1. Partial small bowel obstruction (H)            Requesting physician: Dr. Francois                  Assessment and Plan:   Assessment:  Jeramie Licea is a 63-year-old male presenting with obstipation and abdominal pain to the urgency room and subsequently transferred to [facility] ER.  CT 1/29/2024 consistent with small bowel obstruction per radiology note, wall thickening within the jejunum and gastric antrum. Afebrile, VSS, no leukocytosis.      Plan:  -N.p.o.  -NG to LIS  -IVF  -No plans for surgical intervention  -Continue to monitor for return of bowel function  -Ambulating as tolerated, okay to clamp NG tube with activities  -Medical management per primary team    I, Bud Pagan D.O. have seen and evaluated Jeramie Licea today as part of a shared APRN/PA visit. I reviewed the Advance Practice Practitioner's history and physical exam as well as the diagnosis and plan.     My key exam findings include 1 day history of abdominal distention with inability to pass stool.  Underwent imaging which demonstrated thickened small bowel concerning for either infection versus bowel obstruction.  Has no history of bowel obstructions nor does he have history of surgery on his abdomen.  Abdomen-soft, nontender, nondistended  CT reports noted, no images to review available    Key management decisions made by me and carried out under my direction include:  Abdominal discomfort with inability to pass stool and reports of thickened small intestine.  This may represent an infectious etiology versus small bowel obstruction of some other nefarious etiology.  Currently, he looks good from a clinical standpoint.  He is hungry and is actually passing gas.  Will clamp his NG tube and start him on clear liquids.  If he continues to do well we can hold off on any Gastrografin small  bowel follow-through.  Will continue to monitor.     I Spent 15 minutes face-to-face and/or coordinating care. Over 50% of our time on the unit was spent counseling the patient and/or coordinating care regarding ongoing management of his questionable bowel obstruction.    Bud Pagan DO [facility]  [phone]            Chief Complaint:     Chief Complaint   Patient presents with    Abdominal Pain    SBO        History is obtained from the patient    HPI:   Jeramie Licea is a 63 year old male who presents with obstipation and abdominal pain since late Sunday or Monday morning.  Patient states on Sunday he had 1 bowel movement in the morning, does not remember passing gas.  Patient ate regular diet on Sunday, states he had a relatively large breakfast. Abdominal pain increased and patient became nauseated. Since NG tubing has not had nausea and tolerating well. Feels he has passed very small amount of gas roughly 5 times and has not had bowel movement. Abdominal pain has lessened, does not feel he is more bloated than yesterday.    PMH generally healthy and does not take any medications or blood thinners at home. No past surgeries and has never had SBO prior. Recent colonoscopy in 2023 states they took 2 polyps out and told him to come back in 2 years, doesn't state pathology results. Denies fever, chills, nausea, vomiting.          Past Medical History:   No past medical history on file.           Past Surgical History:   No past surgical history on file.          Social History:    reports that he has never smoked. He has never been exposed to tobacco smoke. He has never used smokeless tobacco.           Family History:     Family History   Problem Relation Age of Onset    Hyperlipidemia Mother     Hypertension Mother     Hyperlipidemia Father     Prostate Cancer Father     Colon Cancer Sister     Prostate Cancer Brother               Allergies:   No Known Allergies           Medications:  "    Prior to Admission medications    Not on File              Review of Systems:   The Review of Systems is negative other than noted in the HPI            Physical Exam:   Patient Vitals for the past 24 hrs:   BP Temp Temp src Pulse Resp SpO2 Height Weight   01/30/24 0713 126/79 98.3  F (36.8  C) Oral 58 18 94 % -- --   01/29/24 2328 125/75 98.2  F (36.8  C) Oral 60 20 97 % -- --   01/29/24 1945 139/86 97.5  F (36.4  C) Oral 59 18 95 % -- --   01/29/24 1809 135/86 97.6  F (36.4  C) Oral 63 18 94 % 1.803 m (5' 10.98\") 88.4 kg (194 lb 14.2 oz)   01/29/24 1730 (!) 146/87 -- -- 59 -- 95 % -- --   01/29/24 1715 139/85 -- -- 61 -- 94 % -- --   01/29/24 1700 137/80 -- -- 62 -- 94 % -- --   01/29/24 1645 139/79 -- -- 62 -- 94 % -- --   01/29/24 1638 (!) 140/81 -- -- 60 -- 95 % -- --   01/29/24 1212 (!) 154/88 -- -- -- -- -- -- --   01/29/24 1211 -- 97.7  F (36.5  C) Temporal -- -- -- -- --   01/29/24 1210 -- -- -- 55 18 96 % 1.803 m (5' 11\") 91.6 kg (201 lb 15.1 oz)          Intake/Output Summary (Last 24 hours) at 1/30/2024 1041  Last data filed at 1/29/2024 2000  Gross per 24 hour   Intake 500 ml   Output 1500 ml   Net -1000 ml      Constitutional:   Awake, alert, cooperative, no apparent distress, and appears stated age lying in bed       Eyes:   conjunctiva/corneas clear, EOM's intact; no scleral edema or icterus noted        ENT:   Normocephalic, without obvious abnormality, atraumatic, Lips, mucosa, and tongue normal        Lungs:   Normal respiratory effort, no accessory muscle use       Abdomen:   Abdomen distended and nontender to palpation over all four quadrants and epigastric area. No peritoneal signs or guarding.        Musculoskeletal:   No obvious swelling, bruising or deformity       Skin:   Skin color and texture normal for patient, no rashes or lesions              Data:         All imaging studies reviewed by me. CT abdomen pelvis done at external source, reviewed radiology note unable to assess " image.    Results for orders placed or performed during the hospital encounter of 01/29/24 (from the past 24 hour(s))   CBC with platelets + differential    Narrative    The following orders were created for panel order CBC with platelets + differential.  Procedure                               Abnormality         Status                     ---------                               -----------         ------                     CBC with platelets and d...[[id number]]                      Final result                 Please view results for these tests on the individual orders.   Basic metabolic panel   Result Value Ref Range    Sodium 141 135 - 145 mmol/L    Potassium 4.2 3.4 - 5.3 mmol/L    Chloride 105 98 - 107 mmol/L    Carbon Dioxide (CO2) 23 22 - 29 mmol/L    Anion Gap 13 7 - 15 mmol/L    Urea Nitrogen 10.0 8.0 - 23.0 mg/dL    Creatinine 0.97 0.67 - 1.17 mg/dL    GFR Estimate 88 >60 mL/min/1.73m2    Calcium 9.2 8.8 - 10.2 mg/dL    Glucose 113 (H) 70 - 99 mg/dL   Lactic acid whole blood   Result Value Ref Range    Lactic Acid 1.2 0.7 - 2.0 mmol/L   CBC with platelets and differential   Result Value Ref Range    WBC Count 7.7 4.0 - 11.0 10e3/uL    RBC Count 5.36 4.40 - 5.90 10e6/uL    Hemoglobin 17.0 13.3 - 17.7 g/dL    Hematocrit 49.2 40.0 - 53.0 %    MCV 92 78 - 100 fL    MCH 31.7 26.5 - 33.0 pg    MCHC 34.6 31.5 - 36.5 g/dL    RDW 12.4 10.0 - 15.0 %    Platelet Count 230 150 - 450 10e3/uL    % Neutrophils 73 %    % Lymphocytes 16 %    % Monocytes 9 %    % Eosinophils 1 %    % Basophils 1 %    % Immature Granulocytes 0 %    NRBCs per 100 WBC 0 <1 /100    Absolute Neutrophils 5.6 1.6 - 8.3 10e3/uL    Absolute Lymphocytes 1.2 0.8 - 5.3 10e3/uL    Absolute Monocytes 0.7 0.0 - 1.3 10e3/uL    Absolute Eosinophils 0.1 0.0 - 0.7 10e3/uL    Absolute Basophils 0.0 0.0 - 0.2 10e3/uL    Absolute Immature Granulocytes 0.0 <=0.4 10e3/uL    Absolute NRBCs 0.0 10e3/uL   Magnesium   Result Value Ref Range    Magnesium 2.2 1.7 -  2.3 mg/dL   Phosphorus   Result Value Ref Range    Phosphorus 4.5 2.5 - 4.5 mg/dL   Hemoglobin   Result Value Ref Range    Hemoglobin 14.5 13.3 - 17.7 g/dL   Basic metabolic panel   Result Value Ref Range    Sodium 142 135 - 145 mmol/L    Potassium 4.2 3.4 - 5.3 mmol/L    Chloride 110 (H) 98 - 107 mmol/L    Carbon Dioxide (CO2) 26 22 - 29 mmol/L    Anion Gap 6 (L) 7 - 15 mmol/L    Urea Nitrogen 11.2 8.0 - 23.0 mg/dL    Creatinine 0.96 0.67 - 1.17 mg/dL    GFR Estimate 89 >60 mL/min/1.73m2    Calcium 8.1 (L) 8.8 - 10.2 mg/dL    Glucose 110 (H) 70 - 99 mg/dL           Yessy Estevez PA-C  [facility]  [address] [place] [address] [address]  [place] [address]

## 2024-01-30 NOTE — PLAN OF CARE
Problem: Adult Inpatient Plan of Care  Goal: Plan of Care Reviewed  note.  1/30/2024 1424 by Guille Arnett RN  Outcome: Progressing  1/30/2024 1401 by Guille Arnett RN  Outcome: Progressing  Flowsheets (Taken 1/30/2024 1401)  Plan of Care Reviewed With: patient  Goal: Patient-Specific Goal (Individualized)    Outcome: Progressing  Goal: Absence of Hospital-Acquired Illness or Injury  Outcome: Progressing  Intervention: Identify and Manage Fall Risk  Recent Flowsheet Documentation  Taken 1/30/2024 0845 by Guille Arnett RN  Safety Promotion/Fall Prevention: activity supervised  Intervention: Prevent Skin Injury  Recent Flowsheet Documentation  Taken 1/30/2024 0845 by Guille Arnett RN  Body Position: supine  Goal: Optimal Comfort and Wellbeing  Outcome: Progressing     Problem: Nausea and Vomiting  Goal: Nausea and Vomiting Relief  Outcome: Progressing     Problem: Pain Acute  Goal: Optimal Pain Control and Function  Outcome: Progressing   Goal Outcome Evaluation:      Plan of Care Reviewed With: patient  Patient complains of having discomfort on left nostril from the NG. Pt wanted to have something to eat.

## 2024-01-30 NOTE — PLAN OF CARE
Goal Outcome Evaluation:             Pt. LIS NG tube is intact and working well.  Rosa, greenish thick output. Pt. With IV fluds at 100 ml's / hour.

## 2024-01-31 VITALS
BODY MASS INDEX: 27.28 KG/M2 | RESPIRATION RATE: 18 BRPM | WEIGHT: 194.89 LBS | DIASTOLIC BLOOD PRESSURE: 78 MMHG | HEART RATE: 62 BPM | OXYGEN SATURATION: 94 % | HEIGHT: 71 IN | SYSTOLIC BLOOD PRESSURE: 137 MMHG | TEMPERATURE: 98.5 F

## 2024-01-31 LAB
ANION GAP SERPL CALCULATED.3IONS-SCNC: 7 MMOL/L (ref 7–15)
BUN SERPL-MCNC: 6.9 MG/DL (ref 8–23)
CALCIUM SERPL-MCNC: 8.4 MG/DL (ref 8.8–10.2)
CHLORIDE SERPL-SCNC: 107 MMOL/L (ref 98–107)
CREAT SERPL-MCNC: 0.99 MG/DL (ref 0.67–1.17)
DEPRECATED HCO3 PLAS-SCNC: 26 MMOL/L (ref 22–29)
EGFRCR SERPLBLD CKD-EPI 2021: 86 ML/MIN/1.73M2
GLUCOSE SERPL-MCNC: 107 MG/DL (ref 70–99)
HOLD SPECIMEN: NORMAL
POTASSIUM SERPL-SCNC: 4.3 MMOL/L (ref 3.4–5.3)
SODIUM SERPL-SCNC: 140 MMOL/L (ref 135–145)

## 2024-01-31 PROCEDURE — 80048 BASIC METABOLIC PNL TOTAL CA: CPT | Performed by: INTERNAL MEDICINE

## 2024-01-31 PROCEDURE — 258N000001 HC RX 258: Performed by: INTERNAL MEDICINE

## 2024-01-31 PROCEDURE — 99239 HOSP IP/OBS DSCHRG MGMT >30: CPT | Performed by: HOSPITALIST

## 2024-01-31 PROCEDURE — 99231 SBSQ HOSP IP/OBS SF/LOW 25: CPT | Performed by: PHYSICIAN ASSISTANT

## 2024-01-31 PROCEDURE — 36415 COLL VENOUS BLD VENIPUNCTURE: CPT | Performed by: INTERNAL MEDICINE

## 2024-01-31 RX ADMIN — POTASSIUM CHLORIDE, DEXTROSE MONOHYDRATE AND SODIUM CHLORIDE: 150; 5; 900 INJECTION, SOLUTION INTRAVENOUS at 03:58

## 2024-01-31 ASSESSMENT — ACTIVITIES OF DAILY LIVING (ADL)
ADLS_ACUITY_SCORE: 20

## 2024-01-31 NOTE — PROGRESS NOTES
Patient discharged home. All personal belongings taken with from room. Significant other to transport pt. Home.

## 2024-01-31 NOTE — PLAN OF CARE
Problem: Adult Inpatient Plan of Care  Goal: Optimal Comfort and Wellbeing  Outcome: Progressing     Problem: Nausea and Vomiting  Goal: Nausea and Vomiting Relief  Outcome: Progressing     Problem: Pain Acute  Goal: Optimal Pain Control and Function  Outcome: Progressing  Intervention: Prevent or Manage Pain  Recent Flowsheet Documentation  Taken 1/30/2024 2300 by Jennifer Richardson, RN  Medication Review/Management: medications reviewed  Taken 1/30/2024 2115 by Jennifer Richardson RN  Medication Review/Management: medications reviewed   Goal Outcome Evaluation:  A&Ox4. Denies pain and nausea. Tolerating clear liquid diet. Independent in the room. NG clamped.

## 2024-01-31 NOTE — PROGRESS NOTES
Patient is doing well today.  Has been passing gas, no bowel movement yet.  Tolerating clear liquids.  NG has been removed.  Denies any abdominal pain or nausea.  Bowel sounds a little bit quiet on exam and he is a little bit bloated.  Belly is soft and nontender however.  Anticipate likely will have a bowel movement later today and he could discharge after that.  Discussed follow-up plans including kidney MRI with patient.  He has had  colonoscopy a year ago with a 15mm polyp and is supposed to return in 2 years for another colonoscopy. He has FHx colon cancer. Richy RIVERA full note to follow

## 2024-01-31 NOTE — PLAN OF CARE
Problem: Nausea and Vomiting  Goal: Nausea and Vomiting Relief  Outcome: Progressing     Problem: Pain Acute  Goal: Optimal Pain Control and Function  Outcome: Progressing  Intervention: Prevent or Manage Pain  Recent Flowsheet Documentation  Taken 1/31/2024 2659 by Jayla Brown RN  Medication Review/Management: medications reviewed   Goal Outcome Evaluation:    Patient advanced to regular diet today and tolerating well. Denies pain, nausea and has not had any episodes of emesis. Had small bowel movement today. Ambulating in hallways. Will discharge this afternoon.

## 2024-01-31 NOTE — PROGRESS NOTES
General Surgery Progress Note:    Hospital Day # 2    ASSESSMENT:   1. Partial small bowel obstruction (H)        Jeramie Licea is a 63 year old male admitted with an SBO. Patient initially NPO with NG tube decompression. Clinically stable and patient tolerated NG tube clamp trial and clear liquid diet and subsequently passing flatus. Okay to advance diet as tolerated.    PLAN:   - NG tube removed  - Okay to advance diet as tolerated  - Encourage ambulation and increase activity to continue to promote bowel function  - General surgery will sign off at this time. Please page/call if further questions    SUBJECTIVE:   Jeramie Licea is feeling better this morning. Reports tolerating a clear liquid diet. Tried beef broth, juice and two jellos without nausea or vomiting but did feel somewhat full. Passing flatus, no BM yet. Has not been ambulating very much because of NG tube and IV pole. Voiding independently.     Patient Vitals for the past 24 hrs:   BP Temp Temp src Pulse Resp SpO2   01/31/24 0720 137/78 98.5  F (36.9  C) Oral 62 18 94 %   01/30/24 2351 (!) 143/80 97.9  F (36.6  C) Axillary -- 20 91 %   01/30/24 1517 (!) 148/86 97.8  F (36.6  C) Oral 59 18 94 %       Physical Exam:  General: patient seen resting in bed, no acute distress  HEENT: NG tube clamped and removed at bedside.  Resp: no respiratory distress, breathing comfortably on room air  Abdomen: Softly distended, non-tender. No rebound or guarding.  Extremities: warm and well perfused    Admission on 01/29/2024   Component Date Value    Sodium 01/29/2024 141     Potassium 01/29/2024 4.2     Chloride 01/29/2024 105     Carbon Dioxide (CO2) 01/29/2024 23     Anion Gap 01/29/2024 13     Urea Nitrogen 01/29/2024 10.0     Creatinine 01/29/2024 0.97     GFR Estimate 01/29/2024 88     Calcium 01/29/2024 9.2     Glucose 01/29/2024 113 (H)     Lactic Acid 01/29/2024 1.2     WBC Count 01/29/2024 7.7     RBC Count 01/29/2024 5.36     Hemoglobin  01/29/2024 17.0     Hematocrit 01/29/2024 49.2     MCV 01/29/2024 92     MCH 01/29/2024 31.7     MCHC 01/29/2024 34.6     RDW 01/29/2024 12.4     Platelet Count 01/29/2024 230     % Neutrophils 01/29/2024 73     % Lymphocytes 01/29/2024 16     % Monocytes 01/29/2024 9     % Eosinophils 01/29/2024 1     % Basophils 01/29/2024 1     % Immature Granulocytes 01/29/2024 0     NRBCs per 100 WBC 01/29/2024 0     Absolute Neutrophils 01/29/2024 5.6     Absolute Lymphocytes 01/29/2024 1.2     Absolute Monocytes 01/29/2024 0.7     Absolute Eosinophils 01/29/2024 0.1     Absolute Basophils 01/29/2024 0.0     Absolute Immature Granul* 01/29/2024 0.0     Absolute NRBCs 01/29/2024 0.0     Magnesium 01/29/2024 2.2     Phosphorus 01/29/2024 4.5     Hemoglobin 01/30/2024 14.5     Sodium 01/30/2024 142     Potassium 01/30/2024 4.2     Chloride 01/30/2024 110 (H)     Carbon Dioxide (CO2) 01/30/2024 26     Anion Gap 01/30/2024 6 (L)     Urea Nitrogen 01/30/2024 11.2     Creatinine 01/30/2024 0.96     GFR Estimate 01/30/2024 89     Calcium 01/30/2024 8.1 (L)     Glucose 01/30/2024 110 (H)     Sodium 01/31/2024 140     Potassium 01/31/2024 4.3     Chloride 01/31/2024 107     Carbon Dioxide (CO2) 01/31/2024 26     Anion Gap 01/31/2024 7     Urea Nitrogen 01/31/2024 6.9 (L)     Creatinine 01/31/2024 0.99     GFR Estimate 01/31/2024 86     Calcium 01/31/2024 8.4 (L)     Glucose 01/31/2024 107 (H)     Hold Specimen 01/31/2024 BLADIMIR Guajardo PA-C  [facility]  [address]  [address]  [place] [address]

## 2024-01-31 NOTE — PLAN OF CARE
Goal Outcome Evaluation:    NG tube clamed this afternoon. Tolerating clear liquid diet. No nausea, pain or vomiting, Passing a small amount of gas.

## 2024-01-31 NOTE — DISCHARGE SUMMARY
[facility] MEDICINE  DISCHARGE SUMMARY     Primary Care Physician: Lucy Burk  Admission Date: 1/29/2024   Discharge Provider: Kelle Lockhart MD Discharge Date: 1/31/2024   Diet:   Active Diet and Nourishment Order   Procedures    Regular Diet Adult    Diet       Code Status: Full Code   Activity: DCACTIVITY: Activity as tolerated        Condition at Discharge: Good     REASON FOR PRESENTATION(See Admission Note for Details)   Nausea, abd pain    PRINCIPAL & ACTIVE DISCHARGE DIAGNOSES     Principal Problem:    Partial small bowel obstruction (H)      PENDING LABS     Unresulted Labs Ordered in the Past 30 Days of this Admission       No orders found from 12/30/2023 to 1/30/2024.            PROCEDURES ( this hospitalization only)      NG placement and removal    RECOMMENDATIONS TO OUTPATIENT PROVIDER FOR F/U VISIT     Follow-up Appointments     Follow-up and recommended labs and tests       Follow up with primary care provider, Lucy Burk, within 7 days.  MRI of kidneys as outpatient.  Colonoscopy when due.  Some inflammation of stomach seen on CT scan. Recommend endoscopy of your   stomach as outpatient. GI referral sent and your should hear from them to   schedule. Call [facility] at [phone] if you do not hear anything in a few   days.            DISPOSITION     Home    SUMMARY OF HOSPITAL COURSE:      Jeramie Licea is a 63 year old male with no significant past medical history who presented for evaluation of nausea and abdominal pain found to have small bowel obstruction.      # Small bowel obstruction   -etiology of bowel obstruction is unclear, patient does not have a history of abdominal surgery that would result in adhesive bowel disease.  No history of cancer or inflammatory disorders of the bowel.  Location of obstruction appeared to be within the duodenum where there was some wall thickening with upstream fecalization etc.  Unclear if possibly this  could be a viral gastroenteritis that led to bowel dysmotility and obstruction.  Occult malignancy is a possible concern.  -Patient had NG placed and was decompressed overnight.  He quickly improved and was able to have NG removed.  This morning he was tolerating clear liquids and passed a bowel movement.  Diet will be advanced and he will be discharged home.  -Recommend soft, easy to digest diet for the time being he can gradually advance back to his previous diet.  -Patient is up-to-date with his cancer screening, he did note he had a concerning polyp on colonoscopy about 1 year ago and is supposed to return in 2 years and certainly recommend he remain up-to-date with that.  He does have what sounds like a significant family history of colon cancer.  Patient is a lifelong non-smoker.  He did have some questionable lesions on his right kidney and he will be getting an MRI as outpatient for further evaluation of these.    # Possible gastritis   -on abdominal imaging, patient was noted with gastric antral wall thickening consistent with possible gastritis.  This could be part of the same viral process that caused the above.  Patient was not having any symptoms specific for gastritis.  Given the concern for possible occult malignancy as above, patient would benefit from endoscopy for further evaluation, referral sent to GI.    # Multiple indeterminate renal lesions  -Patient had CT abdomen as above noted multiple left renal lesions, some R renal cysts less concerning. Outpatient Renal MRI ordered for further characterization.       Discharge Medications with Med changes:     There are no discharge medications for this patient.        Consults     SURGERY GENERAL IP CONSULT         SIGNIFICANT IMAGING FINDINGS     No results found for this visit on 01/29/24.    SIGNIFICANT LABORATORY FINDINGS     See emr    Discharge Orders        MR Renal wo & w Contrast     Adult GI  Referral - Procedure Only      Reason for  your hospital stay    Bowel obstruction     Follow-up and recommended labs and tests     Follow up with primary care provider, Lucy Burk, within 7 days.  MRI of kidneys as outpatient.  Colonoscopy when due.  Some inflammation of stomach seen on CT scan. Recommend endoscopy of your stomach as outpatient. GI referral sent and your should hear from them to schedule. Call [facility] at [phone] if you do not hear anything in a few days.     Activity    Your activity upon discharge: activity as tolerated     When to contact your care team    Call your primary doctor if you have any of the following: chest pain, shortness of breath, fever, chills, fainting, dizziness, vomiting, constipation, dehydration, worsening pain, bleeding, or trouble urinating, or any other symptoms that are new or concerning to you.     Diet    Follow this diet upon discharge: start with soft, easy to digest and/or liquid foods and gradually resume your regular diet as tolerated.       Examination   Physical Exam   Temp:  [97.8  F (36.6  C)-98.5  F (36.9  C)] 98.5  F (36.9  C)  Pulse:  [59-62] 62  Resp:  [18-20] 18  BP: (137-148)/(78-86) 137/78  SpO2:  [91 %-94 %] 94 %  Wt Readings from Last 1 Encounters:   01/29/24 88.4 kg (194 lb 14.2 oz)       General: in no apparent distress, non-toxic, and alert male lying in hospital bed oriented x3  HEENT: Head normocephalic atraumatic, oral mucosa moist. Sclerae anicteric  CV: Regular rhythm, normal rate, no murmurs  Resp: No wheezes, no rales or rhonchi, no focal consolidations  GI: Belly soft,  mildly distended , nontender, bowel sounds hypoactive  Skin: No rashes or lesions  Extremities: No peripheral edema  Psych: Normal affect, mood dysthymic  Neuro: Grossly normal    Please see EMR for more detailed significant labs, imaging, consultant notes etc.    IKelle MD, personally saw the patient today and spent greater than 30 minutes discharging this patient.    Kelle Lockhart MD  [facility]  [facility]    CC:Lucy Burk

## 2024-02-04 NOTE — COMMUNITY RESOURCES LIST (ENGLISH)
02/04/2024   [facility]  N/A  For questions about this resource list or additional care needs, please contact your primary care clinic or care manager.  Phone: [phone]   Email: N/A   Address: [address] [place] [address]   Hours: N/A        Financial Stability       Utility payment assistance  1  [facility] Distance: 6.19 miles      Phone/Virtual   [address] [place] [address]  Language: English  Hours: Mon - Fri 9:00 AM - 4:00 PM , Sun 9:30 AM - 12:00 PM  Fees: Free   Phone: (965) 273-8711 Email: [email or web address] Website: [email or web address]     2  [employer] [facility] - Supportive [facility] Assistance Program (SHAP) - Utility payment assistance Distance: 7 miles      Phone/Virtual   [address] [place] [address]  Language: English, Hmong, Christina Ferrer  Hours: Mon - Fri 8:30 AM - 5:00 PM  Fees: Free   Phone: (903) 400-7295 Email: maral@[email or web address] Website: http://www.hmong.org/hap-impact-areas/          Important Numbers & Websites       Emergency Services   911  [facility]   311  Poison Control   (454) 367-2004  Suicide Prevention Lifeline   (631) 376-4627 (TALK)  Child Abuse Hotline   (691) 222-7801 (4-A-Child)  Sexual Assault Hotline   (238) 763-9560 (HOPE)  National Runaway Safeline   (550) 695-5033 (RUNAWAY)  All-Options Talkline   (832) 643-6717  Substance Abuse Referral   (236) 783-4456 (HELP)

## 2024-02-09 ENCOUNTER — OFFICE VISIT (OUTPATIENT)
Dept: FAMILY MEDICINE | Facility: CLINIC | Age: 64
End: 2024-02-09
Payer: COMMERCIAL

## 2024-02-09 VITALS
DIASTOLIC BLOOD PRESSURE: 72 MMHG | HEART RATE: 43 BPM | HEIGHT: 69 IN | TEMPERATURE: 98.1 F | RESPIRATION RATE: 16 BRPM | WEIGHT: 194 LBS | OXYGEN SATURATION: 98 % | BODY MASS INDEX: 28.73 KG/M2 | SYSTOLIC BLOOD PRESSURE: 140 MMHG

## 2024-02-09 DIAGNOSIS — R68.81 EARLY SATIETY: ICD-10-CM

## 2024-02-09 DIAGNOSIS — Z09 HOSPITAL DISCHARGE FOLLOW-UP: Primary | ICD-10-CM

## 2024-02-09 DIAGNOSIS — R00.1 BRADYCARDIA: ICD-10-CM

## 2024-02-09 DIAGNOSIS — Z86.0100 HISTORY OF COLONIC POLYPS: ICD-10-CM

## 2024-02-09 DIAGNOSIS — K56.600 PARTIAL SMALL BOWEL OBSTRUCTION (H): ICD-10-CM

## 2024-02-09 DIAGNOSIS — R93.89 ABNORMAL CT SCAN: ICD-10-CM

## 2024-02-09 LAB
ATRIAL RATE - MUSE: 59 BPM
BASOPHILS # BLD AUTO: 0.1 10E3/UL (ref 0–0.2)
BASOPHILS NFR BLD AUTO: 1 %
DIASTOLIC BLOOD PRESSURE - MUSE: NORMAL MMHG
EOSINOPHIL # BLD AUTO: 0.2 10E3/UL (ref 0–0.7)
EOSINOPHIL NFR BLD AUTO: 3 %
ERYTHROCYTE [DISTWIDTH] IN BLOOD BY AUTOMATED COUNT: 12.1 % (ref 10–15)
HCT VFR BLD AUTO: 46.7 % (ref 40–53)
HGB BLD-MCNC: 15.8 G/DL (ref 13.3–17.7)
IMM GRANULOCYTES # BLD: 0 10E3/UL
IMM GRANULOCYTES NFR BLD: 0 %
INTERPRETATION ECG - MUSE: NORMAL
LYMPHOCYTES # BLD AUTO: 1.7 10E3/UL (ref 0.8–5.3)
LYMPHOCYTES NFR BLD AUTO: 28 %
MCH RBC QN AUTO: 31.4 PG (ref 26.5–33)
MCHC RBC AUTO-ENTMCNC: 33.8 G/DL (ref 31.5–36.5)
MCV RBC AUTO: 93 FL (ref 78–100)
MONOCYTES # BLD AUTO: 0.6 10E3/UL (ref 0–1.3)
MONOCYTES NFR BLD AUTO: 9 %
NEUTROPHILS # BLD AUTO: 3.6 10E3/UL (ref 1.6–8.3)
NEUTROPHILS NFR BLD AUTO: 59 %
P AXIS - MUSE: 42 DEGREES
PLATELET # BLD AUTO: 240 10E3/UL (ref 150–450)
PR INTERVAL - MUSE: 150 MS
QRS DURATION - MUSE: 80 MS
QT - MUSE: 408 MS
QTC - MUSE: 403 MS
R AXIS - MUSE: -32 DEGREES
RBC # BLD AUTO: 5.03 10E6/UL (ref 4.4–5.9)
SYSTOLIC BLOOD PRESSURE - MUSE: NORMAL MMHG
T AXIS - MUSE: 32 DEGREES
VENTRICULAR RATE- MUSE: 59 BPM
WBC # BLD AUTO: 6.1 10E3/UL (ref 4–11)

## 2024-02-09 PROCEDURE — 93005 ELECTROCARDIOGRAM TRACING: CPT | Performed by: FAMILY MEDICINE

## 2024-02-09 PROCEDURE — 99214 OFFICE O/P EST MOD 30 MIN: CPT | Mod: 25 | Performed by: FAMILY MEDICINE

## 2024-02-09 PROCEDURE — 36415 COLL VENOUS BLD VENIPUNCTURE: CPT | Performed by: FAMILY MEDICINE

## 2024-02-09 PROCEDURE — 2894A COMPREHENSIVE METABOLIC PANEL: CPT | Performed by: FAMILY MEDICINE

## 2024-02-09 PROCEDURE — 80050 GENERAL HEALTH PANEL: CPT | Performed by: FAMILY MEDICINE

## 2024-02-09 PROCEDURE — 2894A TSH WITH FREE T4 REFLEX: CPT | Performed by: FAMILY MEDICINE

## 2024-02-09 PROCEDURE — 93010 ELECTROCARDIOGRAM REPORT: CPT | Performed by: INTERNAL MEDICINE

## 2024-02-09 PROCEDURE — 84439 ASSAY OF FREE THYROXINE: CPT | Performed by: FAMILY MEDICINE

## 2024-02-09 NOTE — PROGRESS NOTES
"  Assessment & Plan     ICD-10-CM    1. Hospital discharge follow-up  Z09       2. History of colonic polyps  Z86.010       3. Partial small bowel obstruction (H)  K56.600       4. Bradycardia  R00.1 TSH with free T4 reflex     EKG 12-lead, tracing only      5. Early satiety  R68.81 Comprehensive metabolic panel (BMP + Alb, Alk Phos, ALT, AST, Total. Bili, TP)     CBC with platelets and differential      6. Abnormal CT scan  R93.89         Patient is here today for hospital discharge follow-up.  He was admitted with partial small bowel obstruction treated conservatively.  During hospital admission he had a CT scan done that showed a couple of abnormalities.  There were noted renal lesions and he is already scheduled to have an MRI done.  CT scan did show a shock segment ileal wall thickening within the mid jejunum.  In addition there was also mild gastric antral wall thickening.  He was asked to follow-up for endoscopy outpatient and a referral was already placed.  This has not been scheduled yet.  Patient does feel some early satiety but otherwise feels he is back to his baseline.  Noted bradycardia and I did obtain an EKG which shows sinus bradycardia as reviewed by me.  Will check labs noting early satiety for any changes from baseline labs done last July.      MED REC REQUIRED  Post Medication Reconciliation Status:  Discharge medications reconciled, continue medications without change  BMI  Estimated body mass index is 28.44 kg/m  as calculated from the following:    Height as of this encounter: 1.759 m (5' 9.25\").    Weight as of this encounter: 88 kg (194 lb).         See Patient Instructions    Rm Bobo is a 63 year old, presenting for the following health issues:  Hospital F/U (01/29-01/31 at [facility] for partial bowel blockage./ Patient is doing fine. No concerns) and Abdominal Pain (Stomach pain after eating stuffed mushrooms Sunday night)        2/9/2024     2:43 PM   Additional Questions " "  Roomed by Catarina SMALLWOOD CMA     [facility] Follow-up Visit:    Hospital/Nursing Home/IP Rehab Facility: [facility]  Date of Admission: 1/29/2024  Date of Discharge: 1/31/2024  Reason(s) for Admission: Small Bowel obstruction    Was your hospitalization related to COVID-19? No   Problems taking medications regularly:  None  Medication changes since discharge: None  Problems adhering to non-medication therapy:  N/A    Summary of hospitalization:  [facility] discharge summary reviewed  Diagnostic Tests/Treatments reviewed.  Follow up needed: MRI  Other Healthcare Providers Involved in Patient s Care:         Specialist appointment - with [facility] needed to be scheduled  Update since discharge: improved.         Plan of care communicated with patient           Patient Active Problem List   Diagnosis    Partial small bowel obstruction (H)    History of colonic polyps    Bradycardia    Early satiety     No current outpatient medications on file.     No current facility-administered medications for this visit.             Review of Systems  Constitutional, HEENT, cardiovascular, pulmonary, gi and gu systems are negative, except as otherwise noted.      Objective    BP (!) 155/82 (BP Location: Left arm, Patient Position: Sitting, Cuff Size: Adult Regular)   Pulse (!) 43   Temp 98.1  F (36.7  C) (Oral)   Resp 16   Ht 1.759 m (5' 9.25\")   Wt 88 kg (194 lb)   SpO2 98%   BMI 28.44 kg/m    Body mass index is 28.44 kg/m .  Physical Exam   GENERAL: alert and no distress  NECK: no adenopathy, no asymmetry, masses, or scars  RESP: lungs clear to auscultation - no rales, rhonchi or wheezes  CV: regular rate and rhythm, normal S1 S2, no S3 or S4, no murmur, click or rub, no peripheral edema  ABDOMEN: soft, nontender, no hepatosplenomegaly, no masses and bowel sounds normal  MS: no gross musculoskeletal defects noted, no edema    Results for orders placed or performed in visit on " 02/09/24 (from the past 24 hour(s))   EKG 12-lead, tracing only   Result Value Ref Range    Systolic Blood Pressure  mmHg    Diastolic Blood Pressure  mmHg    Ventricular Rate 59 BPM    Atrial Rate 59 BPM    MT Interval 150 ms    QRS Duration 80 ms     ms    QTc 403 ms    P Axis 42 degrees    R AXIS -32 degrees    T Axis 32 degrees    Interpretation ECG       Sinus bradycardia with occasional Premature ventricular complexes  Possible Left atrial enlargement  Left axis deviation  Abnormal ECG  No previous ECGs available  Confirmed by KATIE RIVERA, LES LOC:BHARGAVI ([id number]) on 2/9/2024 4:02:05 PM     CBC with platelets and differential    Narrative    The following orders were created for panel order CBC with platelets and differential.  Procedure                               Abnormality         Status                     ---------                               -----------         ------                     CBC with platelets and d...[[id number]]                      In process                   Please view results for these tests on the individual orders.           Signed Electronically by: Lucy Burk MD

## 2024-02-09 NOTE — PATIENT INSTRUCTIONS
[facility]   [place] [address]   Phone: [phone]   Fax: [phone]   Recommend endoscopy of your   stomach as outpatient. GI referral sent and your should hear from them to   schedule. Call [facility] at [phone] if you do not hear anything.

## 2024-02-10 LAB
ALBUMIN SERPL BCG-MCNC: 4.5 G/DL (ref 3.5–5.2)
ALP SERPL-CCNC: 68 U/L (ref 40–150)
ALT SERPL W P-5'-P-CCNC: 23 U/L (ref 0–70)
ANION GAP SERPL CALCULATED.3IONS-SCNC: 12 MMOL/L (ref 7–15)
AST SERPL W P-5'-P-CCNC: 22 U/L (ref 0–45)
BILIRUB SERPL-MCNC: 0.6 MG/DL
BUN SERPL-MCNC: 12.3 MG/DL (ref 8–23)
CALCIUM SERPL-MCNC: 9.4 MG/DL (ref 8.8–10.2)
CHLORIDE SERPL-SCNC: 105 MMOL/L (ref 98–107)
CREAT SERPL-MCNC: 0.99 MG/DL (ref 0.67–1.17)
DEPRECATED HCO3 PLAS-SCNC: 24 MMOL/L (ref 22–29)
EGFRCR SERPLBLD CKD-EPI 2021: 86 ML/MIN/1.73M2
GLUCOSE SERPL-MCNC: 89 MG/DL (ref 70–99)
POTASSIUM SERPL-SCNC: 4.5 MMOL/L (ref 3.4–5.3)
PROT SERPL-MCNC: 7.2 G/DL (ref 6.4–8.3)
SODIUM SERPL-SCNC: 141 MMOL/L (ref 135–145)
T4 FREE SERPL-MCNC: 1 NG/DL (ref 0.9–1.7)
TSH SERPL DL<=0.005 MIU/L-ACNC: 6.53 UIU/ML (ref 0.3–4.2)

## 2024-02-13 ENCOUNTER — PATIENT OUTREACH (OUTPATIENT)
Dept: GASTROENTEROLOGY | Facility: CLINIC | Age: 64
End: 2024-02-13
Payer: COMMERCIAL

## 2024-02-13 DIAGNOSIS — R79.89 ELEVATED TSH: Primary | ICD-10-CM

## 2024-02-20 ENCOUNTER — HOSPITAL ENCOUNTER (OUTPATIENT)
Dept: MRI IMAGING | Facility: HOSPITAL | Age: 64
Discharge: HOME | End: 2024-02-20
Attending: HOSPITALIST | Admitting: HOSPITALIST
Payer: COMMERCIAL

## 2024-02-20 DIAGNOSIS — N28.9 RENAL LESION: ICD-10-CM

## 2024-02-20 PROCEDURE — 74183 MRI ABD W/O CNTR FLWD CNTR: CPT

## 2024-02-20 PROCEDURE — A9585 GADOBUTROL INJECTION: HCPCS | Performed by: HOSPITALIST

## 2024-02-20 PROCEDURE — 255N000002 HC RX 255 OP 636: Performed by: HOSPITALIST

## 2024-02-20 RX ORDER — GADOBUTROL 604.72 MG/ML
0.1 INJECTION INTRAVENOUS ONCE
Status: COMPLETED | OUTPATIENT
Start: 2024-02-20 | End: 2024-02-20

## 2024-02-20 RX ADMIN — GADOBUTROL 9 ML: 604.72 INJECTION INTRAVENOUS at 10:01

## 2024-09-29 ENCOUNTER — HEALTH MAINTENANCE LETTER (OUTPATIENT)
Age: 64
End: 2024-09-29

## 2024-11-24 ENCOUNTER — HOSPITAL ENCOUNTER (EMERGENCY)
Facility: HOSPITAL | Age: 64
Discharge: HOME | End: 2024-11-24
Attending: EMERGENCY MEDICINE | Admitting: EMERGENCY MEDICINE
Payer: COMMERCIAL

## 2024-11-24 VITALS
OXYGEN SATURATION: 96 % | WEIGHT: 198 LBS | HEIGHT: 69 IN | HEART RATE: 57 BPM | TEMPERATURE: 97.4 F | DIASTOLIC BLOOD PRESSURE: 84 MMHG | RESPIRATION RATE: 16 BRPM | BODY MASS INDEX: 29.33 KG/M2 | SYSTOLIC BLOOD PRESSURE: 142 MMHG

## 2024-11-24 DIAGNOSIS — H57.11 ACUTE RIGHT EYE PAIN: ICD-10-CM

## 2024-11-24 DIAGNOSIS — S05.01XA ABRASION OF RIGHT CORNEA, INITIAL ENCOUNTER: ICD-10-CM

## 2024-11-24 PROCEDURE — 250N000009 HC RX 250

## 2024-11-24 PROCEDURE — 99284 EMERGENCY DEPT VISIT MOD MDM: CPT

## 2024-11-24 PROCEDURE — 250N000009 HC RX 250: Performed by: EMERGENCY MEDICINE

## 2024-11-24 RX ORDER — ERYTHROMYCIN 5 MG/G
OINTMENT OPHTHALMIC ONCE
Status: COMPLETED | OUTPATIENT
Start: 2024-11-24 | End: 2024-11-24

## 2024-11-24 RX ORDER — POLYMYXIN B SULFATE AND TRIMETHOPRIM 1; 10000 MG/ML; [USP'U]/ML
1-2 SOLUTION OPHTHALMIC EVERY 4 HOURS
Qty: 10 ML | Refills: 0 | Status: SHIPPED | OUTPATIENT
Start: 2024-11-24

## 2024-11-24 RX ORDER — TETRACAINE HYDROCHLORIDE 5 MG/ML
SOLUTION OPHTHALMIC
Status: COMPLETED
Start: 2024-11-24 | End: 2024-11-24

## 2024-11-24 RX ORDER — TETRACAINE HYDROCHLORIDE 5 MG/ML
1 SOLUTION OPHTHALMIC ONCE
Status: COMPLETED | OUTPATIENT
Start: 2024-11-24 | End: 2024-11-24

## 2024-11-24 RX ADMIN — FLUORESCEIN SODIUM 1 STRIP: 1 STRIP OPHTHALMIC at 02:56

## 2024-11-24 RX ADMIN — ERYTHROMYCIN 1 G: 5 OINTMENT OPHTHALMIC at 01:53

## 2024-11-24 RX ADMIN — TETRACAINE HYDROCHLORIDE 1 DROP: 5 SOLUTION OPHTHALMIC at 01:30

## 2024-11-24 RX ADMIN — FLUORESCEIN SODIUM 1 STRIP: 1 STRIP OPHTHALMIC at 01:30

## 2024-11-24 ASSESSMENT — VISUAL ACUITY
OU: NOT TESTED
OD: 20/70;WITHOUT CORRECTIVE LENSES
OS: LESS THAN 20/400;WITHOUT CORRECTIVE LENSES

## 2024-11-24 ASSESSMENT — COLUMBIA-SUICIDE SEVERITY RATING SCALE - C-SSRS
1. IN THE PAST MONTH, HAVE YOU WISHED YOU WERE DEAD OR WISHED YOU COULD GO TO SLEEP AND NOT WAKE UP?: NO
6. HAVE YOU EVER DONE ANYTHING, STARTED TO DO ANYTHING, OR PREPARED TO DO ANYTHING TO END YOUR LIFE?: NO
2. HAVE YOU ACTUALLY HAD ANY THOUGHTS OF KILLING YOURSELF IN THE PAST MONTH?: NO

## 2024-11-24 ASSESSMENT — ACTIVITIES OF DAILY LIVING (ADL)
ADLS_ACUITY_SCORE: 0
ADLS_ACUITY_SCORE: 0

## 2024-11-24 NOTE — ED PROVIDER NOTES
"EMERGENCY DEPARTMENT ENCOUNTER      NAME: Jeramie Licea  AGE: 64 year old male  YOB: 1960  EVALUATION DATE & TIME: 11/24/2024  1:11 AM    ED PROVIDER: Olga Alvarez MD    Chief Complaint   Patient presents with    Foreign Body in Eye       FINAL IMPRESSION  1. Acute right eye pain    2. Abrasion of right cornea, initial encounter        MEDICAL DECISION MAKING   Jeramie Licea is a 64 year old male who presents for evaluation of eye pain.  Patient reports that he woke up a few hours ago feeling like he had a \"flake of something\" in his right eye.  He reports that he could feel it in the upper outer portion under his lid.  He tried to splash water in his eye then got in the shower and had water run into it hoping that he would be able to get a foreign body out but was unable to do so.  He has not had any vision change or loss and reports that he was feeling well prior to going to bed last night.  He reports that he has some increasing discomfort when he looks around with this eye but otherwise, does not have any ocular pain.  He has not had any fever, fall/trauma, or other new symptoms.  He does not wear contact lenses.  He reports that he once saw an eye doctor for eyeglasses in the past but does not follow with anyone routinely.    Considered a broad differential including but not limited to bacterial or viral conjunctivitis, allergic conjunctivitis, toxic/medication reaction, chemical irritation, foreign body, corneal abrasion.  Low suspicion for episcleritis given symptoms are bilateral.  No evidence of subconjunctival hemorrhage.  No significant ocular pain, other systemic symptoms, or exam findings to suggest acute angle-closure glaucoma.  Exam is not consistent with hyphema, hypopyon, iritis, scleritis, or bacterial/viral keratitis.  Discussed options for workup and management with the patient.  We have agreed on plan for tetracaine and Woods lamp exam.    I instilled tetracaine and " "used fluorescein strip to perform Woods lamp exam.  There did appear to be some inflammation to the lateral conjunctiva but I did not see any foreign body or significant laceration/ulceration.  Additionally, no dendritic lesions or other obvious abnormalities.    After Villegas lamp exam, patient continued to complain of discomfort and requested that we irrigate his eye here to ensure there were no remaining foreign bodies.  RN was able to irrigate his eye and did apply antibiotic ointment.  Patient continued to complain of \"excruciating\" pain.  I reassessed him after he returned to his room and we again discussed potential etiology of his symptoms.  I explained that the foreign body sensation may be irritation/inflammation or a small abrasion to the cornea.  He verbalized understanding.  Out of abundance of caution, I did offer to perform a second Woods lamp exam and again, send no evidence of foreign body or significant laceration/abnormality.  Visual acuity is intact.  Although patient does have significant erythema to the conjunctiva, I suspect this is related to the amount of irrigation and rubbing he has done both here and prior to arrival.  I recommended we start an antibiotic and have him follow-up closely with ophthalmology.  He was somewhat reluctant to start the antibiotic ointment, as he states that it is \"goopy and he cannot see.\"  With this, I did offer to send with a prescription for drops instead.  Also sent a referral to [facility] where patient has been seen in the past.     At the end of the encounter, we reviewed the results, potential diagnoses, as well as return precautions and recommendations for follow up. I instructed Mr. Licea to return to the emergency department immediately if he develops any new or worsening symptoms and provided additional verbal discharge instructions. Mr. Licea expressed understanding and agreement with this plan of care, his questions were answered, " and he was discharged in stable condition.      Considerations in Medical Decision Making  History:  Obtained supplemental history: Supplemental history obtained?: Family Member/Significant Other  Reviewed external records: External records reviewed?: No  Care impacted by chronic illness: Other: None    Work Up:  In additional to work up documented, I considered the following work up: Documented in chart, if applicable.  Chart documentation includes differential considered and any EKGs or imaging independently interpreted by provider, where specified.    External consultation:  Discussion of management with another provider: Documented in chart, if applicable    Disposition considerations: Discharge. I prescribed additional prescription strength medication(s) as charted. See documentation for any additional details.    MIPS Documentation: Not Applicable       ED COURSE    1:22 AM I met with the patient, obtained history, performed an initial exam, and discussed options and plan for diagnostics and treatment here in the ED. I performed a Woods Lamp exam.   3:03 AM I performed a second Woods Lamp exam.   3:13 AM We discussed plans for discharge including supportive cares, symptomatic treatment, outpatient follow up, and reasons to return to the emergency department.      MEDICATIONS GIVEN IN THE ED  Medications   tetracaine (PONTOCAINE) 0.5 % ophthalmic solution 1 drop (1 drop Right Eye $Given 11/24/24 0130)   fluorescein (FUL-JUAN) ophthalmic strip 1 strip (1 strip Right Eye $Given 11/24/24 0130)   erythromycin (ROMYCIN) ophthalmic ointment (1 g Right Eye $Given 11/24/24 0153)   fluorescein (FUL-JUAN) ophthalmic strip 1 strip (1 strip Right Eye $Given by Other 11/24/24 0256)       NEW PRESCRIPTIONS STARTED AT TODAY'S VISIT  Discharge Medication List as of 11/24/2024  3:16 AM        START taking these medications    Details   polymixin b-trimethoprim (POLYTRIM) 38788-3.1 UNIT/ML-% ophthalmic solution Place 1-2 drops  "into the right eye every 4 hours., Disp-10 mL, R-0, E-Prescribe                =================================================================    HPI:    Use of : N/A      Jeramie Licea is a 64 year old male who presents to this emergency department for evaluation of eye pain.     The patient rolled over in bed tonight (11/24) when he felt \"a flake of something\" enter his right eye. His eye felt okay prior to this. Currently, he endorses a sharp pain in the right upper portion of his right eye that is worsened by moving his eyeball. His vision from that eye is okay when he looks straight. He attempted to soak his eye in a cup of water and take a shower without any improvement in his symptoms. He does not wear eye contacts.       RELEVANT HISTORY, MEDICATIONS, & ALLERGIES   Past medical history, surgical history, family history, medications, and allergies reviewed and pertinent noted in HPI.    REVIEW OF SYSTEMS:  A complete review of systems was performed with pertinent positives and negatives noted in the HPI.     PHYSICAL EXAM:    Vitals: BP (!) 142/84   Pulse 57   Temp 97.4  F (36.3  C) (Oral)   Resp 16   Ht 1.759 m (5' 9.25\")   Wt 89.8 kg (198 lb)   SpO2 96%   BMI 29.03 kg/m    General: Awake, alert, interactive.   HENT: Atraumatic. MMM.   Right eye: Conjunctival injection.  No visible foreign bodies.  No visible ulceration.  No globe compromise.  No proptosis.  Pupil round and reactive.  Extraocular movements intact.  Left eye: Conjunctiva clear.  Pupil round and reactive.  Extraocular movements intact.  Neck: Full AROM.  Cardiovascular: Regular rate.  Respiratory/Chest: Normal work of breathing.   Abdomen: Non-distended.   Musculoskeletal: Normal appearing extremities without obvious deformities or signs of trauma.   Skin: Normal color. No visible rash.  Neurologic: Alert, oriented. Speech clear. CN's grossly intact. Moving all extremities spontaneously.   Psychiatric: Normal " affect/mood.       PROCEDURES    PROCEDURE: Woods lamp Exam   INDICATIONS: Eye pain   PROCEDURE PROVIDER: Dr Olga Alvarez   SITE: right eye   CONSENT:  The risks, benefits and alternatives for this procedure were explained to the patient and verbally accepted.     MEDICATION: fluorescein stain and tetracaine   EXAM FINDINGS: Right Eye: Conjunctival erythema.  No visible foreign bodies, ulceration, dendritic lesions.  No obvious laceration or abrasion.   COMPLICATIONS: Patient tolerated procedure well, without complication     PROCEDURE: Woods lamp Exam   INDICATIONS: Ongoing eye pain    PROCEDURE PROVIDER: Dr Olga Alvarez   SITE: right eye   CONSENT:  The risks, benefits and alternatives for this procedure were explained to the patient and verbally accepted.     MEDICATION: fluorescein stain and tetracaine   EXAM FINDINGS: Right Eye: Increasing conjunctival erythema.  No visible foreign bodies, ulceration, dendritic lesions.   COMPLICATIONS: Patient tolerated procedure well, without complication       I, Dona Kimbrough, am serving as a scribe to document services personally performed by Dr. Olga Alvarez based on my observation and the provider's statements to me. I, Olga Alvarez MD attest that Dona Kimbrough is acting in a scribe capacity, has observed my performance of the services and has documented them in accordance with my direction.    Olga Alvarez M.D.  Emergency Medicine  [facility] EMERGENCY DEPARTMENT  [facility] [facility] [address]  [phone]  Dept: [phone]     Olga Alvarez MD  11/24/24 2839

## 2024-11-24 NOTE — DISCHARGE INSTRUCTIONS
You were seen in the Emergency Department today for eye pain.     I think that your pain is probably related to a small scratch.  I am sending with a prescription for antibiotic drops.  Hopefully these will be more comfortable for you than the ointment you got here.  For your pain, you can continue to use Tylenol and ibuprofen.      Please return to the ER if you experience uncontrolled pain, vision loss, fever, and/or for any other new or concerning symptoms, otherwise please follow up with the eye doctor in 1-2 days for recheck.     Below is some information you might find useful.     Thank you for choosing [facility]. It was a pleasure taking care of you today!  - Dr. Olga Alvarez

## 2024-11-24 NOTE — ED TRIAGE NOTES
The patient reports turning in bed at 2200 tonight and something got into his right eye. He feels like it is still there. He has increased tearing. He is still able to see.

## 2024-11-24 NOTE — ED NOTES
Patient reports that the pain is still present, increases when he looks to the left. Offered Tylenol and or Motrin to the patient, patient declined. Provider updated, will see patient shortly.

## 2025-02-27 ENCOUNTER — PATIENT OUTREACH (OUTPATIENT)
Dept: GASTROENTEROLOGY | Facility: CLINIC | Age: 65
End: 2025-02-27
Payer: COMMERCIAL

## 2025-02-27 DIAGNOSIS — Z12.11 SPECIAL SCREENING FOR MALIGNANT NEOPLASM OF COLON: Primary | ICD-10-CM

## 2025-02-27 NOTE — PROGRESS NOTES
"CRC Screening Colonoscopy Referral Review    Patient meets the inclusion criteria for screening colonoscopy standing order.    Ordering/Referring Provider:  Lucy Burk      BMI: Estimated body mass index is 29.03 kg/m  as calculated from the following:    Height as of 11/24/24: 1.759 m (5' 9.25\").    Weight as of 11/24/24: 89.8 kg (198 lb).     Sedation:  Does patient have any of the following conditions affecting sedation?  No medical conditions affecting sedation.    Previous Scopes:  Any previous recommendations or follow up needs based on previous scope?  na / No recommendations.    Medical Concerns to Postpone Order:  Does patient have any of the following medical concerns that should postpone/delay colonoscopy referral?  No medical conditions affecting colonoscopy referral.    Final Referral Details:  Based on patient's medical history patient is appropriate for referral order with moderate sedation. If patient's BMI > 50 do not schedule in ASC.  "

## 2025-04-07 ENCOUNTER — TELEPHONE (OUTPATIENT)
Dept: GASTROENTEROLOGY | Facility: CLINIC | Age: 65
End: 2025-04-07
Payer: COMMERCIAL

## 2025-04-07 NOTE — TELEPHONE ENCOUNTER
"Endoscopy Scheduling Screen    Have you had any respiratory illness or flu-like symptoms in the last 10 days?  No    What is your communication preference for Instructions and/or Bowel Prep?   MyChart    What insurance is in the chart?  Other:  [employer]    Ordering/Referring Provider: RENO MEJIA   (If ordering provider performs procedure, schedule with ordering provider unless otherwise instructed. )    BMI: Estimated body mass index is 29.03 kg/m  as calculated from the following:    Height as of 11/24/24: 1.759 m (5' 9.25\").    Weight as of 11/24/24: 89.8 kg (198 lb).     Sedation Ordered  moderate sedation.   If patient BMI > 50 do not schedule in ASC.    If patient BMI > 45 do not schedule at [facility].    Are you taking methadone or Suboxone?  NO, No RN review required.    Have you been diagnosed and are being treated for severe PTSD or severe anxiety?  NO, No RN review required.    Are you taking any prescription medications for pain 3 or more times per week?   NO, No RN review required.    Do you have a history of malignant hyperthermia?  No    (Females) Are you currently pregnant?   No     Have you been diagnosed or told you have pulmonary hypertension?   No    Do you have an LVAD?  No    Have you been told you have moderate to severe sleep apnea?  No.    Have you been told you have COPD, asthma, or any other lung disease?  No    Has your doctor ordered any cardiac tests like echo, angiogram, stress test, ablation, or EKG, that you have not completed yet?  No    Do you  have a history of any heart conditions?  No     Have you ever had or are you waiting for an organ transplant?  No. Continue scheduling, no site restrictions.    Have you had a stroke or transient ischemic attack (TIA aka \"mini stroke\") in the last 2 years?   No.    Have you been diagnosed with or been told you have cirrhosis of the liver?   No.    Are you currently on dialysis?   No    Do you need assistance transferring?   No    BMI: Estimated " "body mass index is 29.03 kg/m  as calculated from the following:    Height as of 11/24/24: 1.759 m (5' 9.25\").    Weight as of 11/24/24: 89.8 kg (198 lb).     Is patients BMI > 40 and scheduling location UP?  No    Do you take an injectable or oral medication for weight loss or diabetes (excluding insulin)?  No    Do you take the medication Naltrexone?  No    Do you take blood thinners?  No       Prep   Are you currently on dialysis or do you have chronic kidney disease?  No    Do you have a diagnosis of diabetes?  No    Do you have a diagnosis of cystic fibrosis (CF)?  No    On a regular basis do you go 3 -5 days between bowel movements?  No    BMI > 40?  No    Preferred Pharmacy:    [id number] - [place], [place] - [address] AT [facility] [address]  [address] [facility] [address]  Phone: [phone] Fax: [phone]      Final Scheduling Details     Procedure scheduled  Colonoscopy    Surgeon:  OMAR     Date of procedure:  7/8/25     Pre-OP / PAC:   No - Not required for this site.    Location  [facility] - Patient preference.    Sedation   MAC/Deep Sedation  PER SITE      Patient Reminders:   You will receive a call from a Nurse to review instructions and health history.  This assessment must be completed prior to your procedure.  Failure to complete the Nurse assessment may result in the procedure being cancelled.      On the day of your procedure, please designate an adult(s) who can drive you home stay with you for the next 24 hours. The medicines used in the exam will make you sleepy. You will not be able to drive.      You cannot take public transportation, ride share services, or non-medical taxi service without a responsible caregiver.  Medical transport services are allowed with the requirement that a responsible caregiver will receive you at your destination.  We require that drivers and caregivers are confirmed prior to your procedure.  "

## 2025-06-17 ENCOUNTER — TELEPHONE (OUTPATIENT)
Dept: GASTROENTEROLOGY | Facility: CLINIC | Age: 65
End: 2025-06-17
Payer: COMMERCIAL

## 2025-06-17 NOTE — TELEPHONE ENCOUNTER
Bowel Prep Review:  Disclaimer: No call was made to the patient.     Standard Miralax bowel prep.    Recommended due to standard bowel prep.   Instructions were sent via [employer]      Maria Antonia Gillespie RN  Endoscopy Procedure Pre Assessment

## 2025-07-08 ENCOUNTER — TRANSFERRED RECORDS (OUTPATIENT)
Dept: HEALTH INFORMATION MANAGEMENT | Facility: CLINIC | Age: 65
End: 2025-07-08
Payer: COMMERCIAL